# Patient Record
Sex: FEMALE | Race: WHITE | Employment: FULL TIME | URBAN - METROPOLITAN AREA
[De-identification: names, ages, dates, MRNs, and addresses within clinical notes are randomized per-mention and may not be internally consistent; named-entity substitution may affect disease eponyms.]

---

## 2015-06-10 LAB — PAP SMEAR, EXTERNAL: NEGATIVE

## 2017-02-01 RX ORDER — DEXTROAMPHETAMINE SACCHARATE, AMPHETAMINE ASPARTATE, DEXTROAMPHETAMINE SULFATE AND AMPHETAMINE SULFATE 5; 5; 5; 5 MG/1; MG/1; MG/1; MG/1
20 TABLET ORAL DAILY
Qty: 30 TABLET | Refills: 0 | Status: SHIPPED | OUTPATIENT
Start: 2017-02-01 | End: 2017-03-06 | Stop reason: SDUPTHER

## 2017-02-03 ENCOUNTER — OFFICE VISIT (OUTPATIENT)
Dept: FAMILY MEDICINE CLINIC | Age: 42
End: 2017-02-03

## 2017-02-03 VITALS
DIASTOLIC BLOOD PRESSURE: 74 MMHG | HEIGHT: 66 IN | BODY MASS INDEX: 21.86 KG/M2 | OXYGEN SATURATION: 97 % | SYSTOLIC BLOOD PRESSURE: 120 MMHG | WEIGHT: 136 LBS | HEART RATE: 88 BPM

## 2017-02-03 DIAGNOSIS — F98.8 ADD (ATTENTION DEFICIT DISORDER): Primary | ICD-10-CM

## 2017-02-03 PROCEDURE — 99213 OFFICE O/P EST LOW 20 MIN: CPT | Performed by: FAMILY MEDICINE

## 2017-02-03 ASSESSMENT — PATIENT HEALTH QUESTIONNAIRE - PHQ9
1. LITTLE INTEREST OR PLEASURE IN DOING THINGS: 0
SUM OF ALL RESPONSES TO PHQ QUESTIONS 1-9: 0
2. FEELING DOWN, DEPRESSED OR HOPELESS: 0
SUM OF ALL RESPONSES TO PHQ9 QUESTIONS 1 & 2: 0

## 2017-02-03 ASSESSMENT — ENCOUNTER SYMPTOMS: RESPIRATORY NEGATIVE: 1

## 2017-03-07 RX ORDER — DEXTROAMPHETAMINE SACCHARATE, AMPHETAMINE ASPARTATE, DEXTROAMPHETAMINE SULFATE AND AMPHETAMINE SULFATE 5; 5; 5; 5 MG/1; MG/1; MG/1; MG/1
20 TABLET ORAL DAILY
Qty: 30 TABLET | Refills: 0 | Status: SHIPPED | OUTPATIENT
Start: 2017-03-07 | End: 2017-04-12 | Stop reason: SDUPTHER

## 2017-04-12 ENCOUNTER — TELEPHONE (OUTPATIENT)
Dept: FAMILY MEDICINE CLINIC | Age: 42
End: 2017-04-12

## 2017-04-12 RX ORDER — DEXTROAMPHETAMINE SACCHARATE, AMPHETAMINE ASPARTATE, DEXTROAMPHETAMINE SULFATE AND AMPHETAMINE SULFATE 5; 5; 5; 5 MG/1; MG/1; MG/1; MG/1
20 TABLET ORAL DAILY
Qty: 30 TABLET | Refills: 0 | Status: SHIPPED | OUTPATIENT
Start: 2017-04-12 | End: 2017-05-11 | Stop reason: SDUPTHER

## 2017-04-12 RX ORDER — DOXYCYCLINE HYCLATE 100 MG
100 TABLET ORAL 2 TIMES DAILY
Qty: 60 TABLET | Refills: 1 | Status: SHIPPED | OUTPATIENT
Start: 2017-04-12 | End: 2017-10-16 | Stop reason: SDUPTHER

## 2017-05-11 RX ORDER — DEXTROAMPHETAMINE SACCHARATE, AMPHETAMINE ASPARTATE, DEXTROAMPHETAMINE SULFATE AND AMPHETAMINE SULFATE 5; 5; 5; 5 MG/1; MG/1; MG/1; MG/1
20 TABLET ORAL DAILY
Qty: 30 TABLET | Refills: 0 | Status: SHIPPED | OUTPATIENT
Start: 2017-05-11 | End: 2017-06-12 | Stop reason: SDUPTHER

## 2017-06-12 ENCOUNTER — TELEPHONE (OUTPATIENT)
Dept: FAMILY MEDICINE CLINIC | Age: 42
End: 2017-06-12

## 2017-06-12 RX ORDER — DEXTROAMPHETAMINE SACCHARATE, AMPHETAMINE ASPARTATE, DEXTROAMPHETAMINE SULFATE AND AMPHETAMINE SULFATE 5; 5; 5; 5 MG/1; MG/1; MG/1; MG/1
20 TABLET ORAL DAILY
Qty: 30 TABLET | Refills: 0 | Status: SHIPPED | OUTPATIENT
Start: 2017-06-12 | End: 2017-07-10 | Stop reason: SDUPTHER

## 2017-07-10 RX ORDER — DEXTROAMPHETAMINE SACCHARATE, AMPHETAMINE ASPARTATE, DEXTROAMPHETAMINE SULFATE AND AMPHETAMINE SULFATE 5; 5; 5; 5 MG/1; MG/1; MG/1; MG/1
20 TABLET ORAL DAILY
Qty: 30 TABLET | Refills: 0 | Status: SHIPPED | OUTPATIENT
Start: 2017-07-10 | End: 2017-08-17 | Stop reason: SDUPTHER

## 2017-08-03 ENCOUNTER — OFFICE VISIT (OUTPATIENT)
Dept: FAMILY MEDICINE CLINIC | Age: 42
End: 2017-08-03

## 2017-08-03 VITALS
SYSTOLIC BLOOD PRESSURE: 108 MMHG | WEIGHT: 137.2 LBS | TEMPERATURE: 97.7 F | DIASTOLIC BLOOD PRESSURE: 74 MMHG | RESPIRATION RATE: 14 BRPM | OXYGEN SATURATION: 99 % | HEIGHT: 66 IN | BODY MASS INDEX: 22.05 KG/M2 | HEART RATE: 89 BPM

## 2017-08-03 DIAGNOSIS — E55.9 VITAMIN D DEFICIENCY: ICD-10-CM

## 2017-08-03 DIAGNOSIS — F98.8 ADD (ATTENTION DEFICIT DISORDER): Primary | ICD-10-CM

## 2017-08-03 LAB
A/G RATIO: 1.7 (ref 1.1–2.2)
ALBUMIN SERPL-MCNC: 4.3 G/DL (ref 3.4–5)
ALP BLD-CCNC: 81 U/L (ref 40–129)
ALT SERPL-CCNC: 15 U/L (ref 10–40)
ANION GAP SERPL CALCULATED.3IONS-SCNC: 13 MMOL/L (ref 3–16)
AST SERPL-CCNC: 15 U/L (ref 15–37)
BILIRUB SERPL-MCNC: <0.2 MG/DL (ref 0–1)
BUN BLDV-MCNC: 10 MG/DL (ref 7–20)
CALCIUM SERPL-MCNC: 9.2 MG/DL (ref 8.3–10.6)
CHLORIDE BLD-SCNC: 101 MMOL/L (ref 99–110)
CO2: 25 MMOL/L (ref 21–32)
CREAT SERPL-MCNC: 0.5 MG/DL (ref 0.6–1.1)
GFR AFRICAN AMERICAN: >60
GFR NON-AFRICAN AMERICAN: >60
GLOBULIN: 2.5 G/DL
GLUCOSE BLD-MCNC: 110 MG/DL (ref 70–99)
POTASSIUM SERPL-SCNC: 3.9 MMOL/L (ref 3.5–5.1)
SODIUM BLD-SCNC: 139 MMOL/L (ref 136–145)
TOTAL PROTEIN: 6.8 G/DL (ref 6.4–8.2)

## 2017-08-03 PROCEDURE — 36415 COLL VENOUS BLD VENIPUNCTURE: CPT | Performed by: FAMILY MEDICINE

## 2017-08-03 PROCEDURE — 99213 OFFICE O/P EST LOW 20 MIN: CPT | Performed by: FAMILY MEDICINE

## 2017-08-04 DIAGNOSIS — R73.09 ELEVATED GLUCOSE: Primary | ICD-10-CM

## 2017-08-04 LAB
ESTIMATED AVERAGE GLUCOSE: 96.8 MG/DL
HBA1C MFR BLD: 5 %
VITAMIN D 25-HYDROXY: 29.7 NG/ML

## 2017-08-17 RX ORDER — DEXTROAMPHETAMINE SACCHARATE, AMPHETAMINE ASPARTATE, DEXTROAMPHETAMINE SULFATE AND AMPHETAMINE SULFATE 5; 5; 5; 5 MG/1; MG/1; MG/1; MG/1
20 TABLET ORAL DAILY
Qty: 30 TABLET | Refills: 0 | Status: SHIPPED | OUTPATIENT
Start: 2017-08-17 | End: 2017-09-14 | Stop reason: SDUPTHER

## 2017-09-06 ENCOUNTER — HOSPITAL ENCOUNTER (OUTPATIENT)
Dept: MAMMOGRAPHY | Age: 42
Discharge: HOME | End: 2017-09-06
Attending: FAMILY MEDICINE | Admitting: FAMILY MEDICINE

## 2017-09-06 DIAGNOSIS — Z12.31 VISIT FOR SCREENING MAMMOGRAM: ICD-10-CM

## 2017-09-14 RX ORDER — DEXTROAMPHETAMINE SACCHARATE, AMPHETAMINE ASPARTATE, DEXTROAMPHETAMINE SULFATE AND AMPHETAMINE SULFATE 5; 5; 5; 5 MG/1; MG/1; MG/1; MG/1
20 TABLET ORAL DAILY
Qty: 30 TABLET | Refills: 0 | Status: SHIPPED | OUTPATIENT
Start: 2017-09-14 | End: 2017-10-16 | Stop reason: SDUPTHER

## 2017-10-16 RX ORDER — DOXYCYCLINE HYCLATE 100 MG
100 TABLET ORAL 2 TIMES DAILY
Qty: 60 TABLET | Refills: 1 | Status: SHIPPED | OUTPATIENT
Start: 2017-10-16 | End: 2018-12-12 | Stop reason: SDUPTHER

## 2017-10-16 RX ORDER — DEXTROAMPHETAMINE SACCHARATE, AMPHETAMINE ASPARTATE, DEXTROAMPHETAMINE SULFATE AND AMPHETAMINE SULFATE 5; 5; 5; 5 MG/1; MG/1; MG/1; MG/1
20 TABLET ORAL DAILY
Qty: 30 TABLET | Refills: 0 | Status: SHIPPED | OUTPATIENT
Start: 2017-10-16 | End: 2017-11-15 | Stop reason: SDUPTHER

## 2017-11-15 NOTE — TELEPHONE ENCOUNTER
Last Seen: 8/3/2017    Last Writen: 10/16/2017    Last UDS: No UDS on file    OARRS Run On:  Please run OARRS     Med Agreement Signed On: 07/16/2015    Next Appointment: 2/14/2018    Requested Prescriptions     Pending Prescriptions Disp Refills    amphetamine-dextroamphetamine (ADDERALL, 20MG,) 20 MG tablet 30 tablet 0     Sig: Take 1 tablet by mouth daily .

## 2017-11-16 RX ORDER — DEXTROAMPHETAMINE SACCHARATE, AMPHETAMINE ASPARTATE, DEXTROAMPHETAMINE SULFATE AND AMPHETAMINE SULFATE 5; 5; 5; 5 MG/1; MG/1; MG/1; MG/1
20 TABLET ORAL DAILY
Qty: 30 TABLET | Refills: 0 | Status: SHIPPED | OUTPATIENT
Start: 2017-11-16 | End: 2017-12-18 | Stop reason: SDUPTHER

## 2017-12-18 RX ORDER — DEXTROAMPHETAMINE SACCHARATE, AMPHETAMINE ASPARTATE, DEXTROAMPHETAMINE SULFATE AND AMPHETAMINE SULFATE 5; 5; 5; 5 MG/1; MG/1; MG/1; MG/1
20 TABLET ORAL DAILY
Qty: 30 TABLET | Refills: 0 | Status: SHIPPED | OUTPATIENT
Start: 2017-12-18 | End: 2018-01-24 | Stop reason: SDUPTHER

## 2017-12-18 NOTE — TELEPHONE ENCOUNTER
Last Seen: 8/3/2017    Last Writen: 11-    Last UDS: none on file    OARRS Run On: 12-    Med Agreement Signed On: 7-    Next Appointment: 2/14/2018    Requested Prescriptions     Pending Prescriptions Disp Refills    amphetamine-dextroamphetamine (ADDERALL, 20MG,) 20 MG tablet 30 tablet 0     Sig: Take 1 tablet by mouth daily .

## 2018-01-23 ENCOUNTER — TELEPHONE (OUTPATIENT)
Dept: FAMILY MEDICINE CLINIC | Age: 43
End: 2018-01-23

## 2018-01-24 RX ORDER — DEXTROAMPHETAMINE SACCHARATE, AMPHETAMINE ASPARTATE, DEXTROAMPHETAMINE SULFATE AND AMPHETAMINE SULFATE 5; 5; 5; 5 MG/1; MG/1; MG/1; MG/1
20 TABLET ORAL DAILY
Qty: 30 TABLET | Refills: 0 | Status: SHIPPED | OUTPATIENT
Start: 2018-01-24 | End: 2018-03-05 | Stop reason: SDUPTHER

## 2018-02-05 ENCOUNTER — TELEPHONE (OUTPATIENT)
Dept: FAMILY MEDICINE CLINIC | Age: 43
End: 2018-02-05

## 2018-02-05 DIAGNOSIS — Z20.828 EXPOSURE TO INFLUENZA: Primary | ICD-10-CM

## 2018-02-05 RX ORDER — OSELTAMIVIR PHOSPHATE 75 MG/1
75 CAPSULE ORAL DAILY
Qty: 10 CAPSULE | Refills: 0 | Status: SHIPPED | OUTPATIENT
Start: 2018-02-05 | End: 2018-02-15

## 2018-03-05 RX ORDER — DEXTROAMPHETAMINE SACCHARATE, AMPHETAMINE ASPARTATE, DEXTROAMPHETAMINE SULFATE AND AMPHETAMINE SULFATE 5; 5; 5; 5 MG/1; MG/1; MG/1; MG/1
20 TABLET ORAL DAILY
Qty: 30 TABLET | Refills: 0 | Status: SHIPPED | OUTPATIENT
Start: 2018-03-05 | End: 2018-03-19 | Stop reason: SDUPTHER

## 2018-03-05 NOTE — TELEPHONE ENCOUNTER
Last ROBR:[id number]    Last Writen: 1-    Last UDS: None on file    OARRS Run On: 3-5-2018    Med Agreement Signed On: 7-    Next Appointment: none scheduled Cancelled or NS last 3 appts. Requested Prescriptions     Pending Prescriptions Disp Refills    amphetamine-dextroamphetamine (ADDERALL, 20MG,) 20 MG tablet 30 tablet 0     Sig: Take 1 tablet by mouth daily for 31 days.

## 2018-03-19 ENCOUNTER — OFFICE VISIT (OUTPATIENT)
Dept: FAMILY MEDICINE CLINIC | Age: 43
End: 2018-03-19

## 2018-03-19 VITALS
HEART RATE: 105 BPM | WEIGHT: 139 LBS | BODY MASS INDEX: 21.82 KG/M2 | HEIGHT: 67 IN | SYSTOLIC BLOOD PRESSURE: 130 MMHG | DIASTOLIC BLOOD PRESSURE: 82 MMHG | OXYGEN SATURATION: 99 %

## 2018-03-19 DIAGNOSIS — F98.8 ATTENTION DEFICIT DISORDER, UNSPECIFIED HYPERACTIVITY PRESENCE: Primary | ICD-10-CM

## 2018-03-19 DIAGNOSIS — J30.2 CHRONIC SEASONAL ALLERGIC RHINITIS, UNSPECIFIED TRIGGER: ICD-10-CM

## 2018-03-19 DIAGNOSIS — F32.A DEPRESSION, UNSPECIFIED DEPRESSION TYPE: ICD-10-CM

## 2018-03-19 LAB
T4 FREE: 1.1 NG/DL (ref 0.9–1.8)
TSH SERPL DL<=0.05 MIU/L-ACNC: 1.78 UIU/ML (ref 0.27–4.2)

## 2018-03-19 PROCEDURE — 99214 OFFICE O/P EST MOD 30 MIN: CPT | Performed by: FAMILY MEDICINE

## 2018-03-19 RX ORDER — BUPROPION HYDROCHLORIDE 150 MG/1
TABLET, EXTENDED RELEASE ORAL
Qty: 60 TABLET | Refills: 1 | Status: SHIPPED | OUTPATIENT
Start: 2018-03-19 | End: 2018-09-19 | Stop reason: ALTCHOICE

## 2018-03-19 RX ORDER — DEXTROAMPHETAMINE SACCHARATE, AMPHETAMINE ASPARTATE, DEXTROAMPHETAMINE SULFATE AND AMPHETAMINE SULFATE 5; 5; 5; 5 MG/1; MG/1; MG/1; MG/1
20 TABLET ORAL DAILY
Qty: 30 TABLET | Refills: 0 | Status: SHIPPED | OUTPATIENT
Start: 2018-03-19 | End: 2018-04-20 | Stop reason: SDUPTHER

## 2018-03-19 ASSESSMENT — ENCOUNTER SYMPTOMS
RESPIRATORY NEGATIVE: 1
GASTROINTESTINAL NEGATIVE: 1

## 2018-03-19 ASSESSMENT — PATIENT HEALTH QUESTIONNAIRE - PHQ9
1. LITTLE INTEREST OR PLEASURE IN DOING THINGS: 1
SUM OF ALL RESPONSES TO PHQ QUESTIONS 1-9: 1
2. FEELING DOWN, DEPRESSED OR HOPELESS: 0
SUM OF ALL RESPONSES TO PHQ9 QUESTIONS 1 & 2: 1

## 2018-04-20 RX ORDER — DEXTROAMPHETAMINE SACCHARATE, AMPHETAMINE ASPARTATE, DEXTROAMPHETAMINE SULFATE AND AMPHETAMINE SULFATE 5; 5; 5; 5 MG/1; MG/1; MG/1; MG/1
20 TABLET ORAL DAILY
Qty: 30 TABLET | Refills: 0 | Status: SHIPPED | OUTPATIENT
Start: 2018-04-20 | End: 2018-05-30 | Stop reason: SDUPTHER

## 2018-05-30 DIAGNOSIS — F98.8 ATTENTION DEFICIT DISORDER, UNSPECIFIED HYPERACTIVITY PRESENCE: Primary | ICD-10-CM

## 2018-05-30 RX ORDER — DEXTROAMPHETAMINE SACCHARATE, AMPHETAMINE ASPARTATE, DEXTROAMPHETAMINE SULFATE AND AMPHETAMINE SULFATE 5; 5; 5; 5 MG/1; MG/1; MG/1; MG/1
20 TABLET ORAL DAILY
Qty: 30 TABLET | Refills: 0 | Status: SHIPPED | OUTPATIENT
Start: 2018-05-30 | End: 2018-07-02 | Stop reason: SDUPTHER

## 2018-07-02 DIAGNOSIS — F98.8 ATTENTION DEFICIT DISORDER, UNSPECIFIED HYPERACTIVITY PRESENCE: ICD-10-CM

## 2018-07-02 RX ORDER — DEXTROAMPHETAMINE SACCHARATE, AMPHETAMINE ASPARTATE, DEXTROAMPHETAMINE SULFATE AND AMPHETAMINE SULFATE 5; 5; 5; 5 MG/1; MG/1; MG/1; MG/1
20 TABLET ORAL DAILY
Qty: 30 TABLET | Refills: 0 | Status: SHIPPED | OUTPATIENT
Start: 2018-07-02 | End: 2018-08-13 | Stop reason: SDUPTHER

## 2018-07-02 NOTE — TELEPHONE ENCOUNTER
Last Seen: 3/19/2018    Last Writen: 5/30/18    Last UDS: No UDS    OARRS Run On: attached    Med Agreement Signed On: 7/16/15    Next Appointment: Visit date not found    Requested Prescriptions     Pending Prescriptions Disp Refills    amphetamine-dextroamphetamine (ADDERALL, 20MG,) 20 MG tablet 30 tablet 0     Sig: Take 1 tablet by mouth daily for 31 days. Jeff Merrill

## 2018-08-13 DIAGNOSIS — F98.8 ATTENTION DEFICIT DISORDER, UNSPECIFIED HYPERACTIVITY PRESENCE: ICD-10-CM

## 2018-08-13 RX ORDER — DEXTROAMPHETAMINE SACCHARATE, AMPHETAMINE ASPARTATE, DEXTROAMPHETAMINE SULFATE AND AMPHETAMINE SULFATE 5; 5; 5; 5 MG/1; MG/1; MG/1; MG/1
20 TABLET ORAL DAILY
Qty: 30 TABLET | Refills: 0 | Status: SHIPPED | OUTPATIENT
Start: 2018-08-13 | End: 2018-09-19 | Stop reason: SDUPTHER

## 2018-09-19 ENCOUNTER — OFFICE VISIT (OUTPATIENT)
Dept: FAMILY MEDICINE CLINIC | Age: 43
End: 2018-09-19

## 2018-09-19 VITALS
DIASTOLIC BLOOD PRESSURE: 60 MMHG | BODY MASS INDEX: 21.38 KG/M2 | SYSTOLIC BLOOD PRESSURE: 96 MMHG | OXYGEN SATURATION: 99 % | HEIGHT: 66 IN | HEART RATE: 110 BPM | WEIGHT: 133 LBS

## 2018-09-19 DIAGNOSIS — F98.8 ATTENTION DEFICIT DISORDER, UNSPECIFIED HYPERACTIVITY PRESENCE: ICD-10-CM

## 2018-09-19 DIAGNOSIS — S39.012A SACROILIAC STRAIN, INITIAL ENCOUNTER: Primary | ICD-10-CM

## 2018-09-19 PROCEDURE — 99213 OFFICE O/P EST LOW 20 MIN: CPT | Performed by: FAMILY MEDICINE

## 2018-09-19 RX ORDER — BUPROPION HYDROCHLORIDE 150 MG/1
TABLET, EXTENDED RELEASE ORAL
Qty: 60 TABLET | Refills: 1 | Status: SHIPPED | OUTPATIENT
Start: 2018-09-19 | End: 2018-12-18 | Stop reason: SDUPTHER

## 2018-09-19 RX ORDER — DEXTROAMPHETAMINE SACCHARATE, AMPHETAMINE ASPARTATE, DEXTROAMPHETAMINE SULFATE AND AMPHETAMINE SULFATE 5; 5; 5; 5 MG/1; MG/1; MG/1; MG/1
20 TABLET ORAL DAILY
Qty: 30 TABLET | Refills: 0 | Status: SHIPPED | OUTPATIENT
Start: 2018-09-19 | End: 2018-11-09 | Stop reason: SDUPTHER

## 2018-09-19 RX ORDER — PREDNISONE 20 MG/1
20 TABLET ORAL 2 TIMES DAILY
Qty: 12 TABLET | Refills: 0 | Status: SHIPPED | OUTPATIENT
Start: 2018-09-19 | End: 2018-09-25

## 2018-11-09 DIAGNOSIS — F98.8 ATTENTION DEFICIT DISORDER, UNSPECIFIED HYPERACTIVITY PRESENCE: ICD-10-CM

## 2018-11-09 RX ORDER — DEXTROAMPHETAMINE SACCHARATE, AMPHETAMINE ASPARTATE, DEXTROAMPHETAMINE SULFATE AND AMPHETAMINE SULFATE 5; 5; 5; 5 MG/1; MG/1; MG/1; MG/1
20 TABLET ORAL DAILY
Qty: 30 TABLET | Refills: 0 | Status: SHIPPED | OUTPATIENT
Start: 2018-11-09 | End: 2018-12-12 | Stop reason: SDUPTHER

## 2018-12-12 DIAGNOSIS — F98.8 ATTENTION DEFICIT DISORDER, UNSPECIFIED HYPERACTIVITY PRESENCE: ICD-10-CM

## 2018-12-12 RX ORDER — DOXYCYCLINE HYCLATE 100 MG
100 TABLET ORAL 2 TIMES DAILY
Qty: 60 TABLET | Refills: 1 | Status: SHIPPED | OUTPATIENT
Start: 2018-12-12 | End: 2019-03-27 | Stop reason: SDUPTHER

## 2018-12-12 RX ORDER — DEXTROAMPHETAMINE SACCHARATE, AMPHETAMINE ASPARTATE, DEXTROAMPHETAMINE SULFATE AND AMPHETAMINE SULFATE 5; 5; 5; 5 MG/1; MG/1; MG/1; MG/1
20 TABLET ORAL DAILY
Qty: 30 TABLET | Refills: 0 | Status: SHIPPED | OUTPATIENT
Start: 2018-12-12 | End: 2019-02-20 | Stop reason: SDUPTHER

## 2018-12-18 RX ORDER — BUPROPION HYDROCHLORIDE 150 MG/1
TABLET, EXTENDED RELEASE ORAL
Qty: 60 TABLET | Refills: 1 | OUTPATIENT
Start: 2018-12-18

## 2018-12-18 RX ORDER — BUPROPION HYDROCHLORIDE 150 MG/1
TABLET, EXTENDED RELEASE ORAL
Qty: 60 TABLET | Refills: 3 | Status: SHIPPED | OUTPATIENT
Start: 2018-12-18 | End: 2019-11-03 | Stop reason: SDUPTHER

## 2018-12-18 NOTE — TELEPHONE ENCOUNTER
.  Last office visit  18    Last written 18 #60 with 1 refill    Next office visit scheduled 3/7/2019    Requested Prescriptions     Pending Prescriptions Disp Refills    buPROPion (WELLBUTRIN SR) 150 MG extended release tablet 60 tablet 1     Si daily x 1 week then 1 bid

## 2018-12-18 NOTE — TELEPHONE ENCOUNTER
From: Domenico Jaimes  Sent: 12/18/2018 8:22 AM EST  Subject: Medication Renewal Request    Pam Cole.  Cirilo Estrada would like a refill of the following medications:     buPROPion [facility] SR) 150 MG extended release tablet AMALIA Swan    Preferred pharmacy: [facility] Geetha [facility] [phone]

## 2018-12-18 NOTE — TELEPHONE ENCOUNTER
Last office visit  2018    Last written 2018, 60 tablets with 1 refill.      Next office visit scheduled 3/7/2019    Requested Prescriptions     Pending Prescriptions Disp Refills    buPROPion (WELLBUTRIN SR) 150 MG extended release tablet 60 tablet 1     Si daily x 1 week then 1 bid

## 2019-01-23 RX ORDER — TRAZODONE HYDROCHLORIDE 50 MG/1
TABLET ORAL
Qty: 30 TABLET | Refills: 0 | Status: SHIPPED | OUTPATIENT
Start: 2019-01-23 | End: 2019-12-04

## 2019-02-20 DIAGNOSIS — F98.8 ATTENTION DEFICIT DISORDER, UNSPECIFIED HYPERACTIVITY PRESENCE: ICD-10-CM

## 2019-02-21 RX ORDER — DEXTROAMPHETAMINE SACCHARATE, AMPHETAMINE ASPARTATE, DEXTROAMPHETAMINE SULFATE AND AMPHETAMINE SULFATE 5; 5; 5; 5 MG/1; MG/1; MG/1; MG/1
20 TABLET ORAL DAILY
Qty: 30 TABLET | Refills: 0 | Status: SHIPPED | OUTPATIENT
Start: 2019-02-21 | End: 2019-03-27 | Stop reason: SDUPTHER

## 2019-03-27 ENCOUNTER — OFFICE VISIT (OUTPATIENT)
Dept: FAMILY MEDICINE CLINIC | Age: 44
End: 2019-03-27
Payer: COMMERCIAL

## 2019-03-27 VITALS
HEART RATE: 116 BPM | SYSTOLIC BLOOD PRESSURE: 112 MMHG | HEIGHT: 66 IN | OXYGEN SATURATION: 97 % | WEIGHT: 139 LBS | BODY MASS INDEX: 22.34 KG/M2 | DIASTOLIC BLOOD PRESSURE: 80 MMHG

## 2019-03-27 DIAGNOSIS — N32.81 OAB (OVERACTIVE BLADDER): ICD-10-CM

## 2019-03-27 DIAGNOSIS — F32.A DEPRESSION, UNSPECIFIED DEPRESSION TYPE: ICD-10-CM

## 2019-03-27 DIAGNOSIS — F98.8 ATTENTION DEFICIT DISORDER, UNSPECIFIED HYPERACTIVITY PRESENCE: Primary | ICD-10-CM

## 2019-03-27 PROCEDURE — 99214 OFFICE O/P EST MOD 30 MIN: CPT | Performed by: FAMILY MEDICINE

## 2019-03-27 RX ORDER — OXYBUTYNIN CHLORIDE 5 MG/1
5 TABLET ORAL 3 TIMES DAILY
Qty: 90 TABLET | Refills: 0 | Status: SHIPPED | OUTPATIENT
Start: 2019-03-27 | End: 2020-06-02 | Stop reason: SDUPTHER

## 2019-03-27 RX ORDER — DOXYCYCLINE HYCLATE 100 MG
100 TABLET ORAL 2 TIMES DAILY
Qty: 60 TABLET | Refills: 2 | Status: SHIPPED | OUTPATIENT
Start: 2019-03-27 | End: 2020-02-24 | Stop reason: SDUPTHER

## 2019-03-27 RX ORDER — DEXTROAMPHETAMINE SACCHARATE, AMPHETAMINE ASPARTATE, DEXTROAMPHETAMINE SULFATE AND AMPHETAMINE SULFATE 5; 5; 5; 5 MG/1; MG/1; MG/1; MG/1
20 TABLET ORAL DAILY
Qty: 30 TABLET | Refills: 0 | Status: SHIPPED | OUTPATIENT
Start: 2019-03-27 | End: 2019-05-17 | Stop reason: SDUPTHER

## 2019-03-27 ASSESSMENT — ENCOUNTER SYMPTOMS
SHORTNESS OF BREATH: 0
ABDOMINAL PAIN: 0
BLOOD IN STOOL: 0
COUGH: 0

## 2019-05-16 DIAGNOSIS — F98.8 ATTENTION DEFICIT DISORDER, UNSPECIFIED HYPERACTIVITY PRESENCE: ICD-10-CM

## 2019-05-16 NOTE — TELEPHONE ENCOUNTER
Patient requesting a medication refill.     Medication  amphetamine-dextroamphetamine (ADDERALL, 20MG,) 20 MG tablet       Pharmacy  [facility] Geetha [facility]shea [phone]    Last office visit: 3/27/2019   Next office visit: 9/27/2019

## 2019-05-17 RX ORDER — DEXTROAMPHETAMINE SACCHARATE, AMPHETAMINE ASPARTATE, DEXTROAMPHETAMINE SULFATE AND AMPHETAMINE SULFATE 5; 5; 5; 5 MG/1; MG/1; MG/1; MG/1
20 TABLET ORAL DAILY
Qty: 30 TABLET | Refills: 0 | Status: SHIPPED | OUTPATIENT
Start: 2019-05-17 | End: 2019-06-19 | Stop reason: SDUPTHER

## 2019-06-18 ENCOUNTER — TELEPHONE (OUTPATIENT)
Dept: FAMILY MEDICINE CLINIC | Age: 44
End: 2019-06-18

## 2019-06-18 DIAGNOSIS — F98.8 ATTENTION DEFICIT DISORDER, UNSPECIFIED HYPERACTIVITY PRESENCE: ICD-10-CM

## 2019-06-18 NOTE — TELEPHONE ENCOUNTER
Patient requesting a medication refill.     Medication  amphetamine-dextroamphetamine (ADDERALL, 20MG,) 20 MG tablet         Pharmacy  [address] [place] [address] Geetha Negron [phone]     Last office visit: 3/27/2019     Next office visit: 9/27/2019

## 2019-06-19 RX ORDER — DEXTROAMPHETAMINE SACCHARATE, AMPHETAMINE ASPARTATE, DEXTROAMPHETAMINE SULFATE AND AMPHETAMINE SULFATE 5; 5; 5; 5 MG/1; MG/1; MG/1; MG/1
20 TABLET ORAL DAILY
Qty: 30 TABLET | Refills: 0 | Status: SHIPPED | OUTPATIENT
Start: 2019-06-19 | End: 2019-07-25 | Stop reason: SDUPTHER

## 2019-07-24 DIAGNOSIS — F98.8 ATTENTION DEFICIT DISORDER, UNSPECIFIED HYPERACTIVITY PRESENCE: ICD-10-CM

## 2019-07-25 DIAGNOSIS — F98.8 ATTENTION DEFICIT DISORDER, UNSPECIFIED HYPERACTIVITY PRESENCE: ICD-10-CM

## 2019-07-25 RX ORDER — DEXTROAMPHETAMINE SACCHARATE, AMPHETAMINE ASPARTATE, DEXTROAMPHETAMINE SULFATE AND AMPHETAMINE SULFATE 5; 5; 5; 5 MG/1; MG/1; MG/1; MG/1
20 TABLET ORAL DAILY
Qty: 30 TABLET | Refills: 0 | Status: SHIPPED | OUTPATIENT
Start: 2019-07-25 | End: 2019-08-28 | Stop reason: SDUPTHER

## 2019-07-25 RX ORDER — DEXTROAMPHETAMINE SACCHARATE, AMPHETAMINE ASPARTATE, DEXTROAMPHETAMINE SULFATE AND AMPHETAMINE SULFATE 5; 5; 5; 5 MG/1; MG/1; MG/1; MG/1
20 TABLET ORAL DAILY
Qty: 30 TABLET | Refills: 0 | OUTPATIENT
Start: 2019-07-25 | End: 2019-08-24

## 2019-07-25 RX ORDER — DEXTROAMPHETAMINE SACCHARATE, AMPHETAMINE ASPARTATE, DEXTROAMPHETAMINE SULFATE AND AMPHETAMINE SULFATE 5; 5; 5; 5 MG/1; MG/1; MG/1; MG/1
20 TABLET ORAL DAILY
Qty: 30 TABLET | Refills: 0 | Status: SHIPPED | OUTPATIENT
Start: 2019-07-25 | End: 2019-07-25 | Stop reason: SDUPTHER

## 2019-08-28 ENCOUNTER — TELEPHONE (OUTPATIENT)
Dept: FAMILY MEDICINE CLINIC | Age: 44
End: 2019-08-28

## 2019-08-28 DIAGNOSIS — F98.8 ATTENTION DEFICIT DISORDER, UNSPECIFIED HYPERACTIVITY PRESENCE: ICD-10-CM

## 2019-08-28 RX ORDER — DEXTROAMPHETAMINE SACCHARATE, AMPHETAMINE ASPARTATE, DEXTROAMPHETAMINE SULFATE AND AMPHETAMINE SULFATE 5; 5; 5; 5 MG/1; MG/1; MG/1; MG/1
20 TABLET ORAL DAILY
Qty: 30 TABLET | Refills: 0 | Status: SHIPPED | OUTPATIENT
Start: 2019-08-28 | End: 2019-10-02 | Stop reason: SDUPTHER

## 2019-10-02 ENCOUNTER — TELEPHONE (OUTPATIENT)
Dept: FAMILY MEDICINE CLINIC | Age: 44
End: 2019-10-02

## 2019-10-02 DIAGNOSIS — F98.8 ATTENTION DEFICIT DISORDER, UNSPECIFIED HYPERACTIVITY PRESENCE: ICD-10-CM

## 2019-10-02 RX ORDER — DEXTROAMPHETAMINE SACCHARATE, AMPHETAMINE ASPARTATE, DEXTROAMPHETAMINE SULFATE AND AMPHETAMINE SULFATE 5; 5; 5; 5 MG/1; MG/1; MG/1; MG/1
20 TABLET ORAL DAILY
Qty: 30 TABLET | Refills: 0 | Status: SHIPPED | OUTPATIENT
Start: 2019-10-02 | End: 2019-11-03 | Stop reason: SDUPTHER

## 2019-11-03 DIAGNOSIS — F98.8 ATTENTION DEFICIT DISORDER, UNSPECIFIED HYPERACTIVITY PRESENCE: ICD-10-CM

## 2019-11-05 DIAGNOSIS — F98.8 ATTENTION DEFICIT DISORDER, UNSPECIFIED HYPERACTIVITY PRESENCE: ICD-10-CM

## 2019-11-05 RX ORDER — DEXTROAMPHETAMINE SACCHARATE, AMPHETAMINE ASPARTATE, DEXTROAMPHETAMINE SULFATE AND AMPHETAMINE SULFATE 5; 5; 5; 5 MG/1; MG/1; MG/1; MG/1
20 TABLET ORAL DAILY
Qty: 30 TABLET | Refills: 0 | Status: SHIPPED | OUTPATIENT
Start: 2019-11-05 | End: 2019-12-04 | Stop reason: SDUPTHER

## 2019-11-07 ENCOUNTER — TELEPHONE (OUTPATIENT)
Dept: FAMILY MEDICINE CLINIC | Age: 44
End: 2019-11-07

## 2019-11-07 RX ORDER — DEXTROAMPHETAMINE SACCHARATE, AMPHETAMINE ASPARTATE, DEXTROAMPHETAMINE SULFATE AND AMPHETAMINE SULFATE 5; 5; 5; 5 MG/1; MG/1; MG/1; MG/1
20 TABLET ORAL DAILY
Qty: 30 TABLET | Refills: 0 | OUTPATIENT
Start: 2019-11-07 | End: 2019-12-07

## 2019-12-04 ENCOUNTER — OFFICE VISIT (OUTPATIENT)
Dept: FAMILY MEDICINE CLINIC | Age: 44
End: 2019-12-04
Payer: COMMERCIAL

## 2019-12-04 VITALS
HEART RATE: 101 BPM | OXYGEN SATURATION: 99 % | DIASTOLIC BLOOD PRESSURE: 78 MMHG | SYSTOLIC BLOOD PRESSURE: 130 MMHG | HEIGHT: 67 IN | BODY MASS INDEX: 21.56 KG/M2 | WEIGHT: 137.4 LBS

## 2019-12-04 DIAGNOSIS — F32.A DEPRESSION, UNSPECIFIED DEPRESSION TYPE: ICD-10-CM

## 2019-12-04 DIAGNOSIS — F98.8 ATTENTION DEFICIT DISORDER, UNSPECIFIED HYPERACTIVITY PRESENCE: Primary | ICD-10-CM

## 2019-12-04 DIAGNOSIS — N32.81 OAB (OVERACTIVE BLADDER): ICD-10-CM

## 2019-12-04 PROCEDURE — 99214 OFFICE O/P EST MOD 30 MIN: CPT | Performed by: FAMILY MEDICINE

## 2019-12-04 RX ORDER — DEXTROAMPHETAMINE SACCHARATE, AMPHETAMINE ASPARTATE, DEXTROAMPHETAMINE SULFATE AND AMPHETAMINE SULFATE 5; 5; 5; 5 MG/1; MG/1; MG/1; MG/1
20 TABLET ORAL DAILY
Qty: 30 TABLET | Refills: 0 | Status: SHIPPED | OUTPATIENT
Start: 2019-12-04 | End: 2020-01-15 | Stop reason: SDUPTHER

## 2019-12-04 ASSESSMENT — ENCOUNTER SYMPTOMS
BLOOD IN STOOL: 0
CHEST TIGHTNESS: 0
COUGH: 0
SHORTNESS OF BREATH: 0
ABDOMINAL PAIN: 0

## 2020-01-16 RX ORDER — DEXTROAMPHETAMINE SACCHARATE, AMPHETAMINE ASPARTATE, DEXTROAMPHETAMINE SULFATE AND AMPHETAMINE SULFATE 5; 5; 5; 5 MG/1; MG/1; MG/1; MG/1
20 TABLET ORAL DAILY
Qty: 30 TABLET | Refills: 0 | Status: SHIPPED | OUTPATIENT
Start: 2020-01-16 | End: 2020-02-24 | Stop reason: SDUPTHER

## 2020-02-24 ENCOUNTER — TELEPHONE (OUTPATIENT)
Dept: FAMILY MEDICINE CLINIC | Age: 45
End: 2020-02-24

## 2020-02-24 RX ORDER — DEXTROAMPHETAMINE SACCHARATE, AMPHETAMINE ASPARTATE, DEXTROAMPHETAMINE SULFATE AND AMPHETAMINE SULFATE 5; 5; 5; 5 MG/1; MG/1; MG/1; MG/1
20 TABLET ORAL DAILY
Qty: 30 TABLET | Refills: 0 | Status: SHIPPED | OUTPATIENT
Start: 2020-02-24 | End: 2020-03-31 | Stop reason: SDUPTHER

## 2020-02-25 RX ORDER — DEXTROAMPHETAMINE SACCHARATE, AMPHETAMINE ASPARTATE, DEXTROAMPHETAMINE SULFATE AND AMPHETAMINE SULFATE 5; 5; 5; 5 MG/1; MG/1; MG/1; MG/1
20 TABLET ORAL DAILY
Qty: 30 TABLET | Refills: 0 | OUTPATIENT
Start: 2020-02-25 | End: 2020-03-26

## 2020-02-25 RX ORDER — DOXYCYCLINE HYCLATE 100 MG
100 TABLET ORAL 2 TIMES DAILY
Qty: 60 TABLET | Refills: 5 | Status: SHIPPED | OUTPATIENT
Start: 2020-02-25 | End: 2020-02-26

## 2020-02-25 NOTE — TELEPHONE ENCOUNTER
Refill Request     Last Seen: 12/4/2019    Last Written: 03/2/19    Next Appointment:   Future Appointments   Date Time Provider Chente Kaplan   6/5/2020  8:00 AM DO JOSELINE Malcolm [facility]             Requested Prescriptions     Pending Prescriptions Disp Refills    doxycycline hyclate (VIBRA-TABS) 100 MG tablet 60 tablet 5     Sig: Take 1 tablet by mouth 2 times daily     Refused Prescriptions Disp Refills    amphetamine-dextroamphetamine (ADDERALL, 20MG,) 20 MG tablet 30 tablet 0     Sig: Take 1 tablet by mouth daily for 30 days.      Refused By: Nellie Dao     Reason for Refusal: Refill not appropriate

## 2020-02-26 ENCOUNTER — OFFICE VISIT (OUTPATIENT)
Dept: FAMILY MEDICINE CLINIC | Age: 45
End: 2020-02-26
Payer: COMMERCIAL

## 2020-02-26 VITALS
HEART RATE: 99 BPM | RESPIRATION RATE: 14 BRPM | HEIGHT: 66 IN | DIASTOLIC BLOOD PRESSURE: 70 MMHG | WEIGHT: 139.6 LBS | SYSTOLIC BLOOD PRESSURE: 100 MMHG | OXYGEN SATURATION: 98 % | BODY MASS INDEX: 22.43 KG/M2 | TEMPERATURE: 97.6 F

## 2020-02-26 PROCEDURE — 99213 OFFICE O/P EST LOW 20 MIN: CPT | Performed by: NURSE PRACTITIONER

## 2020-02-26 RX ORDER — BENZONATATE 100 MG/1
100 CAPSULE ORAL 3 TIMES DAILY PRN
Qty: 30 CAPSULE | Refills: 0 | Status: SHIPPED | OUTPATIENT
Start: 2020-02-26 | End: 2020-06-02

## 2020-02-26 RX ORDER — CEFDINIR 300 MG/1
300 CAPSULE ORAL 2 TIMES DAILY
Qty: 14 CAPSULE | Refills: 0 | Status: SHIPPED | OUTPATIENT
Start: 2020-02-26 | End: 2020-03-04

## 2020-02-26 RX ORDER — DOXYCYCLINE HYCLATE 100 MG/1
100 CAPSULE ORAL 2 TIMES DAILY
COMMUNITY
End: 2020-06-02 | Stop reason: SDUPTHER

## 2020-02-26 ASSESSMENT — ENCOUNTER SYMPTOMS
SHORTNESS OF BREATH: 0
CHEST TIGHTNESS: 0
SORE THROAT: 1
DIARRHEA: 0
COUGH: 1

## 2020-02-26 NOTE — PATIENT INSTRUCTIONS
1. Increase fluids  2. mucinex DM 2 times daily  3. Tylenol or Ibuprofen TID as needed  4. Hold doxycycline while taking antibiotic  5. Yvone Cooks was seen today for cough and congestion. Diagnoses and all orders for this visit:    Bronchitis  -     cefdinir (OMNICEF) 300 MG capsule; Take 1 capsule by mouth 2 times daily for 7 days    Cough  -     benzonatate (TESSALON PERLES) 100 MG capsule;  Take 1 capsule by mouth 3 times daily as needed for Cough

## 2020-02-26 NOTE — PROGRESS NOTES
Subjective:      Patient ID: Nancy Vazquez is a 40 y.o. female. HPI     1 month ago with head cold. Cough. Improved. Continues with cough at night past 2 weeks. Morning phlegm yellow. Denies temps. Taking sudafed. Review of Systems   Constitutional: Negative for chills, fever and unexpected weight change. HENT: Positive for postnasal drip and sore throat. Respiratory: Positive for cough. Negative for chest tightness and shortness of breath. Cardiovascular: Negative for chest pain and palpitations. Gastrointestinal: Negative for diarrhea. Neurological: Negative for dizziness and headaches. Psychiatric/Behavioral: Negative. Objective:   Physical Exam  Constitutional:       Appearance: Normal appearance. She is well-developed. HENT:      Head: Normocephalic and atraumatic. Right Ear: Tympanic membrane and ear canal normal.      Left Ear: Tympanic membrane and ear canal normal.      Nose: No congestion or rhinorrhea. Mouth/Throat:      Pharynx: No oropharyngeal exudate or posterior oropharyngeal erythema. Comments: Tonsils enlarged, irregular  Neck:      Musculoskeletal: Normal range of motion and neck supple. Thyroid: No thyromegaly. Cardiovascular:      Rate and Rhythm: Normal rate and regular rhythm. Pulmonary:      Effort: Pulmonary effort is normal.      Breath sounds: Rhonchi present. No wheezing. Comments: Scattered faint rhonchi to cough. Mild dry cough to command. Abdominal:      General: Bowel sounds are normal.      Palpations: Abdomen is soft. Musculoskeletal: Normal range of motion. Skin:     General: Skin is warm. Neurological:      Mental Status: She is alert. Psychiatric:         Behavior: Behavior normal.         Assessment:      1. Bronchitis  2. Cough        Plan:      1. Increase fluids  2. mucinex DM 2 times daily  3. Tylenol or Ibuprofen TID as needed  4. Hold doxycycline while taking antibiotic  5.  Emerita Easley was seen today for

## 2020-03-09 RX ORDER — AZITHROMYCIN 250 MG/1
250 TABLET, FILM COATED ORAL SEE ADMIN INSTRUCTIONS
Qty: 6 TABLET | Refills: 0 | Status: SHIPPED | OUTPATIENT
Start: 2020-03-09 | End: 2020-03-14

## 2020-03-31 RX ORDER — DEXTROAMPHETAMINE SACCHARATE, AMPHETAMINE ASPARTATE, DEXTROAMPHETAMINE SULFATE AND AMPHETAMINE SULFATE 5; 5; 5; 5 MG/1; MG/1; MG/1; MG/1
20 TABLET ORAL DAILY
Qty: 30 TABLET | Refills: 0 | Status: SHIPPED | OUTPATIENT
Start: 2020-03-31 | End: 2020-05-03 | Stop reason: SDUPTHER

## 2020-03-31 NOTE — TELEPHONE ENCOUNTER
.  Last office visit 2/26/2020     Last written 2/24 30 with 0      Next office visit scheduled 6/2/2020    Requested Prescriptions     Pending Prescriptions Disp Refills    amphetamine-dextroamphetamine (ADDERALL, 20MG,) 20 MG tablet 30 tablet 0     Sig: Take 1 tablet by mouth daily for 30 days.

## 2020-05-04 RX ORDER — DEXTROAMPHETAMINE SACCHARATE, AMPHETAMINE ASPARTATE, DEXTROAMPHETAMINE SULFATE AND AMPHETAMINE SULFATE 5; 5; 5; 5 MG/1; MG/1; MG/1; MG/1
20 TABLET ORAL DAILY
Qty: 30 TABLET | Refills: 0 | Status: SHIPPED | OUTPATIENT
Start: 2020-05-04 | End: 2020-06-02 | Stop reason: SDUPTHER

## 2020-06-02 ENCOUNTER — OFFICE VISIT (OUTPATIENT)
Dept: FAMILY MEDICINE CLINIC | Age: 45
End: 2020-06-02
Payer: COMMERCIAL

## 2020-06-02 VITALS
OXYGEN SATURATION: 98 % | BODY MASS INDEX: 23.14 KG/M2 | SYSTOLIC BLOOD PRESSURE: 108 MMHG | WEIGHT: 144 LBS | HEIGHT: 66 IN | HEART RATE: 74 BPM | DIASTOLIC BLOOD PRESSURE: 74 MMHG

## 2020-06-02 LAB
A/G RATIO: 1.7 (ref 1.1–2.2)
ALBUMIN SERPL-MCNC: 4 G/DL (ref 3.4–5)
ALP BLD-CCNC: 69 U/L (ref 40–129)
ALT SERPL-CCNC: 16 U/L (ref 10–40)
ANION GAP SERPL CALCULATED.3IONS-SCNC: 8 MMOL/L (ref 3–16)
AST SERPL-CCNC: 19 U/L (ref 15–37)
BASOPHILS ABSOLUTE: 0 K/UL (ref 0–0.2)
BASOPHILS RELATIVE PERCENT: 0.5 %
BILIRUB SERPL-MCNC: 0.5 MG/DL (ref 0–1)
BUN BLDV-MCNC: 10 MG/DL (ref 7–20)
CALCIUM SERPL-MCNC: 9.1 MG/DL (ref 8.3–10.6)
CHLORIDE BLD-SCNC: 106 MMOL/L (ref 99–110)
CHOLESTEROL, TOTAL: 188 MG/DL (ref 0–199)
CO2: 27 MMOL/L (ref 21–32)
CREAT SERPL-MCNC: 0.6 MG/DL (ref 0.6–1.1)
EOSINOPHILS ABSOLUTE: 0.1 K/UL (ref 0–0.6)
EOSINOPHILS RELATIVE PERCENT: 2 %
GFR AFRICAN AMERICAN: >60
GFR NON-AFRICAN AMERICAN: >60
GLOBULIN: 2.3 G/DL
GLUCOSE BLD-MCNC: 104 MG/DL (ref 70–99)
HCT VFR BLD CALC: 38.8 % (ref 36–48)
HDLC SERPL-MCNC: 51 MG/DL (ref 40–60)
HEMOGLOBIN: 12.7 G/DL (ref 12–16)
LDL CHOLESTEROL CALCULATED: 119 MG/DL
LYMPHOCYTES ABSOLUTE: 2.2 K/UL (ref 1–5.1)
LYMPHOCYTES RELATIVE PERCENT: 37.1 %
MCH RBC QN AUTO: 30.1 PG (ref 26–34)
MCHC RBC AUTO-ENTMCNC: 32.8 G/DL (ref 31–36)
MCV RBC AUTO: 92 FL (ref 80–100)
MONOCYTES ABSOLUTE: 0.4 K/UL (ref 0–1.3)
MONOCYTES RELATIVE PERCENT: 7.2 %
NEUTROPHILS ABSOLUTE: 3.1 K/UL (ref 1.7–7.7)
NEUTROPHILS RELATIVE PERCENT: 53.2 %
PDW BLD-RTO: 13.6 % (ref 12.4–15.4)
PLATELET # BLD: 207 K/UL (ref 135–450)
PMV BLD AUTO: 9.5 FL (ref 5–10.5)
POTASSIUM SERPL-SCNC: 4.8 MMOL/L (ref 3.5–5.1)
RBC # BLD: 4.21 M/UL (ref 4–5.2)
SODIUM BLD-SCNC: 141 MMOL/L (ref 136–145)
TOTAL PROTEIN: 6.3 G/DL (ref 6.4–8.2)
TRIGL SERPL-MCNC: 88 MG/DL (ref 0–150)
VLDLC SERPL CALC-MCNC: 18 MG/DL
WBC # BLD: 5.9 K/UL (ref 4–11)

## 2020-06-02 PROCEDURE — 99214 OFFICE O/P EST MOD 30 MIN: CPT | Performed by: FAMILY MEDICINE

## 2020-06-02 RX ORDER — OXYBUTYNIN CHLORIDE 5 MG/1
5 TABLET ORAL 3 TIMES DAILY
Qty: 90 TABLET | Refills: 2 | Status: SHIPPED | OUTPATIENT
Start: 2020-06-02 | End: 2020-12-03

## 2020-06-02 RX ORDER — DOXYCYCLINE HYCLATE 100 MG/1
100 CAPSULE ORAL 2 TIMES DAILY
Qty: 180 CAPSULE | Refills: 1 | Status: SHIPPED | OUTPATIENT
Start: 2020-06-02 | End: 2020-11-17 | Stop reason: SDUPTHER

## 2020-06-02 RX ORDER — DEXTROAMPHETAMINE SACCHARATE, AMPHETAMINE ASPARTATE, DEXTROAMPHETAMINE SULFATE AND AMPHETAMINE SULFATE 5; 5; 5; 5 MG/1; MG/1; MG/1; MG/1
20 TABLET ORAL DAILY
Qty: 30 TABLET | Refills: 0 | Status: SHIPPED | OUTPATIENT
Start: 2020-06-02 | End: 2020-07-06 | Stop reason: SDUPTHER

## 2020-06-02 ASSESSMENT — ENCOUNTER SYMPTOMS
CHEST TIGHTNESS: 0
COUGH: 0
BLOOD IN STOOL: 0
ABDOMINAL PAIN: 0
CONSTIPATION: 0
SHORTNESS OF BREATH: 0

## 2020-07-07 RX ORDER — DEXTROAMPHETAMINE SACCHARATE, AMPHETAMINE ASPARTATE, DEXTROAMPHETAMINE SULFATE AND AMPHETAMINE SULFATE 5; 5; 5; 5 MG/1; MG/1; MG/1; MG/1
20 TABLET ORAL DAILY
Qty: 30 TABLET | Refills: 0 | Status: SHIPPED | OUTPATIENT
Start: 2020-07-07 | End: 2020-08-03 | Stop reason: SDUPTHER

## 2020-07-07 NOTE — TELEPHONE ENCOUNTER
Refill Request - Controlled Substance    Last Seen: 6/2/2020       Last Written: #30  0rf  6/2/2020    Last UDS: no    OARRS Run On: no    Med Agreement Signed On: 7/16/2015    Next Appointment: 9/2/2020        Requested Prescriptions     Pending Prescriptions Disp Refills    amphetamine-dextroamphetamine (ADDERALL, 20MG,) 20 MG tablet 30 tablet 0     Sig: Take 1 tablet by mouth daily for 30 days.

## 2020-08-03 RX ORDER — DEXTROAMPHETAMINE SACCHARATE, AMPHETAMINE ASPARTATE, DEXTROAMPHETAMINE SULFATE AND AMPHETAMINE SULFATE 5; 5; 5; 5 MG/1; MG/1; MG/1; MG/1
20 TABLET ORAL DAILY
Qty: 30 TABLET | Refills: 0 | Status: SHIPPED | OUTPATIENT
Start: 2020-08-03 | End: 2020-09-07 | Stop reason: SDUPTHER

## 2020-08-04 ENCOUNTER — TELEPHONE (OUTPATIENT)
Dept: FAMILY MEDICINE CLINIC | Age: 45
End: 2020-08-04

## 2020-09-08 RX ORDER — DEXTROAMPHETAMINE SACCHARATE, AMPHETAMINE ASPARTATE, DEXTROAMPHETAMINE SULFATE AND AMPHETAMINE SULFATE 5; 5; 5; 5 MG/1; MG/1; MG/1; MG/1
20 TABLET ORAL DAILY
Qty: 30 TABLET | Refills: 0 | Status: SHIPPED | OUTPATIENT
Start: 2020-09-08 | End: 2020-09-11 | Stop reason: SDUPTHER

## 2020-09-11 ENCOUNTER — TELEPHONE (OUTPATIENT)
Dept: FAMILY MEDICINE CLINIC | Age: 45
End: 2020-09-11

## 2020-09-11 ENCOUNTER — PATIENT MESSAGE (OUTPATIENT)
Dept: FAMILY MEDICINE CLINIC | Age: 45
End: 2020-09-11

## 2020-09-11 RX ORDER — DEXTROAMPHETAMINE SACCHARATE, AMPHETAMINE ASPARTATE, DEXTROAMPHETAMINE SULFATE AND AMPHETAMINE SULFATE 5; 5; 5; 5 MG/1; MG/1; MG/1; MG/1
20 TABLET ORAL DAILY
Qty: 30 TABLET | Refills: 0 | Status: CANCELLED | OUTPATIENT
Start: 2020-09-11 | End: 2020-10-11

## 2020-09-11 RX ORDER — DEXTROAMPHETAMINE SACCHARATE, AMPHETAMINE ASPARTATE, DEXTROAMPHETAMINE SULFATE AND AMPHETAMINE SULFATE 5; 5; 5; 5 MG/1; MG/1; MG/1; MG/1
20 TABLET ORAL DAILY
Qty: 30 TABLET | Refills: 0 | Status: SHIPPED | OUTPATIENT
Start: 2020-09-11 | End: 2020-10-16 | Stop reason: SDUPTHER

## 2020-09-11 RX ORDER — DEXTROAMPHETAMINE SACCHARATE, AMPHETAMINE ASPARTATE, DEXTROAMPHETAMINE SULFATE AND AMPHETAMINE SULFATE 5; 5; 5; 5 MG/1; MG/1; MG/1; MG/1
20 TABLET ORAL DAILY
Qty: 30 TABLET | Refills: 0 | OUTPATIENT
Start: 2020-09-11 | End: 2020-10-11

## 2020-09-11 NOTE — TELEPHONE ENCOUNTER
----- Message from Arvind Vuong sent at 9/11/2020  4:03 PM EDT -----  Subject: Refill Request    QUESTIONS  Name of Medication? amphetamine-dextroamphetamine (ADDERALL   20MG  ) 20 MG tablet  Patient-reported dosage and instructions? Takes 1 pill a day   in the morning. How many days do you have left? 0  Preferred Pharmacy? [address] [place] [id number] [facility]  Pharmacy phone number (if available)? [phone]  Additional Information for Provider? Patient stated prescription refill   was denied   stated the doctor didnt require her to come in to be seen to get it   refilled   which was why appt was made in december. Wants to see about getting   script filled asap  ---------------------------------------------------------------------------  --------------  CALL BACK INFO  What is the best way for the office to contact you? OK to leave message on   voicemail  Preferred Call Back Phone Number?  [phone]

## 2020-10-16 RX ORDER — DEXTROAMPHETAMINE SACCHARATE, AMPHETAMINE ASPARTATE, DEXTROAMPHETAMINE SULFATE AND AMPHETAMINE SULFATE 5; 5; 5; 5 MG/1; MG/1; MG/1; MG/1
20 TABLET ORAL DAILY
Qty: 30 TABLET | Refills: 0 | Status: SHIPPED | OUTPATIENT
Start: 2020-10-16 | End: 2020-11-17 | Stop reason: SDUPTHER

## 2020-10-16 NOTE — TELEPHONE ENCOUNTER
Refill Request     Last Seen: 6/2/2020    Last Written: #30  0rf  9/11/2020    Next Appointment:   Future Appointments   Date Time Provider Chente Rosaura   12/3/2020  8:00 AM DO JOSELINE Malcolm [facility]       Appointment scheduled      Requested Prescriptions     Pending Prescriptions Disp Refills    amphetamine-dextroamphetamine (ADDERALL, 20MG,) 20 MG tablet 30 tablet 0     Sig: Take 1 tablet by mouth daily for 30 days.

## 2020-11-17 RX ORDER — DEXTROAMPHETAMINE SACCHARATE, AMPHETAMINE ASPARTATE, DEXTROAMPHETAMINE SULFATE AND AMPHETAMINE SULFATE 5; 5; 5; 5 MG/1; MG/1; MG/1; MG/1
20 TABLET ORAL DAILY
Qty: 30 TABLET | Refills: 0 | Status: SHIPPED | OUTPATIENT
Start: 2020-11-17 | End: 2020-12-16 | Stop reason: SDUPTHER

## 2020-11-17 RX ORDER — DOXYCYCLINE HYCLATE 100 MG/1
100 CAPSULE ORAL 2 TIMES DAILY
Qty: 180 CAPSULE | Refills: 1 | Status: SHIPPED | OUTPATIENT
Start: 2020-11-17 | End: 2021-01-31 | Stop reason: SDUPTHER

## 2020-11-17 NOTE — TELEPHONE ENCOUNTER
.  Last office visit 6/2/2020     Last written 10/16/2020 30 with 0   Doxycyline 6/2/2020 180 with 1      Next office visit scheduled 12/3/2020    Requested Prescriptions     Pending Prescriptions Disp Refills    doxycycline hyclate (VIBRAMYCIN) 100 MG capsule 180 capsule 1     Sig: Take 1 capsule by mouth 2 times daily rosacea    amphetamine-dextroamphetamine (ADDERALL, 20MG,) 20 MG tablet 30 tablet 0     Sig: Take 1 tablet by mouth daily for 30 days.

## 2020-12-03 ENCOUNTER — VIRTUAL VISIT (OUTPATIENT)
Dept: FAMILY MEDICINE CLINIC | Age: 45
End: 2020-12-03
Payer: COMMERCIAL

## 2020-12-03 PROCEDURE — 99442 PR PHYS/QHP TELEPHONE EVALUATION 11-20 MIN: CPT | Performed by: FAMILY MEDICINE

## 2020-12-03 ASSESSMENT — ENCOUNTER SYMPTOMS
VOMITING: 0
ABDOMINAL PAIN: 0
DIARRHEA: 0
NAUSEA: 0
SHORTNESS OF BREATH: 0
COUGH: 0
CONSTIPATION: 0

## 2020-12-03 NOTE — PROGRESS NOTES
patient is not nervous/anxious. Objective:   Physical Exam      Physical Exam  Neurological:      Mental Status: She is alert and oriented to person, place, and time. Psychiatric:         Mood and Affect: Mood normal.         Behavior: Behavior normal.         Thought Content: Thought content normal.         Judgment: Judgment normal.         Assessment:      1. Attention deficit disorder, unspecified hyperactivity presence    2. OAB (overactive bladder)    3. Rosacea    4. Migraine without aura and without status migrainosus, not intractable            Plan:      Libia Raymond was seen today for 6 month follow-up. Diagnoses and all orders for this visit:    Attention deficit disorder, unspecified hyperactivity presence  Continue medications and try and reduce when possible. Monitor blood pressure and heart rate at home. Call if any palpitations. OAB (overactive bladder)  Call me if overactive bladder becomes more of an issue. Rosacea  Continue medications  Migraine without aura and without status migrainosus, not intractable  Continue the 2 Tylenol as needed for the headaches since they do work-we did discuss a migraine tablet and a tablet to prevent headaches so let me know if you have an issue in 1 to investigate further. This has been a 12 to 14-minute telephone visit  Call back and set up an appointment for about 3 months.         Varinder Castillo, DO

## 2020-12-03 NOTE — PATIENT INSTRUCTIONS
Attention deficit disorder, unspecified hyperactivity presence  Continue medications and try and reduce when possible. Monitor blood pressure and heart rate at home. Call if any palpitations. OAB (overactive bladder)  Call me if overactive bladder becomes more of an issue. Rosacea  Continue medications  Migraine without aura and without status migrainosus, not intractable  Continue the 2 Tylenol as needed for the headaches since they do work-we did discuss a migraine tablet and a tablet to prevent headaches so let me know if you have an issue in 1 to investigate further. This has been a 12 to 14-minute telephone visit  Call back and set up an appointment for about 3 months.         Varinder Castillo, DO

## 2020-12-17 RX ORDER — DEXTROAMPHETAMINE SACCHARATE, AMPHETAMINE ASPARTATE, DEXTROAMPHETAMINE SULFATE AND AMPHETAMINE SULFATE 5; 5; 5; 5 MG/1; MG/1; MG/1; MG/1
20 TABLET ORAL DAILY
Qty: 30 TABLET | Refills: 0 | Status: SHIPPED | OUTPATIENT
Start: 2020-12-17 | End: 2021-01-31 | Stop reason: SDUPTHER

## 2020-12-17 NOTE — TELEPHONE ENCOUNTER
Refill Request - Controlled Substance    Last Seen: 12/3/2020       Last Written: #30  0rf  11/17/2020    Last UDS: no    Med Agreement Signed On: 7/16/2015    Next Appointment: Visit date not found        Requested Prescriptions     Pending Prescriptions Disp Refills    amphetamine-dextroamphetamine (ADDERALL, 20MG,) 20 MG tablet 30 tablet 0     Sig: Take 1 tablet by mouth daily for 30 days.

## 2021-03-17 DIAGNOSIS — F98.8 ATTENTION DEFICIT DISORDER, UNSPECIFIED HYPERACTIVITY PRESENCE: ICD-10-CM

## 2021-03-17 RX ORDER — DEXTROAMPHETAMINE SACCHARATE, AMPHETAMINE ASPARTATE, DEXTROAMPHETAMINE SULFATE AND AMPHETAMINE SULFATE 5; 5; 5; 5 MG/1; MG/1; MG/1; MG/1
20 TABLET ORAL DAILY
Qty: 30 TABLET | Refills: 0 | Status: SHIPPED | OUTPATIENT
Start: 2021-03-17 | End: 2021-04-15 | Stop reason: SDUPTHER

## 2021-03-17 NOTE — TELEPHONE ENCOUNTER
Refill Request - Controlled Substance    Last Seen: 12/3/2020    Last Written: 02/01/21    Last UDS: No resulted procedures found    Med Agreement Signed On: 7/16/15    Next Appointment: No Future appointments         Requested Prescriptions     Pending Prescriptions Disp Refills    amphetamine-dextroamphetamine (ADDERALL, 20MG,) 20 MG tablet 30 tablet 0     Sig: Take 1 tablet by mouth daily for 30 days.

## 2021-04-15 DIAGNOSIS — F98.8 ATTENTION DEFICIT DISORDER, UNSPECIFIED HYPERACTIVITY PRESENCE: ICD-10-CM

## 2021-04-16 RX ORDER — DEXTROAMPHETAMINE SACCHARATE, AMPHETAMINE ASPARTATE, DEXTROAMPHETAMINE SULFATE AND AMPHETAMINE SULFATE 5; 5; 5; 5 MG/1; MG/1; MG/1; MG/1
20 TABLET ORAL DAILY
Qty: 30 TABLET | Refills: 0 | Status: SHIPPED | OUTPATIENT
Start: 2021-04-16 | End: 2021-04-22

## 2021-04-16 NOTE — TELEPHONE ENCOUNTER
Refill Request     Last Seen: 12/3/2020    Last Written: 3/17/2021    Next Appointment:   No future appointments. Requested Prescriptions     Pending Prescriptions Disp Refills    amphetamine-dextroamphetamine (ADDERALL, 20MG,) 20 MG tablet 30 tablet 0     Sig: Take 1 tablet by mouth daily for 30 days.

## 2021-04-22 DIAGNOSIS — F98.8 ATTENTION DEFICIT DISORDER, UNSPECIFIED HYPERACTIVITY PRESENCE: Primary | ICD-10-CM

## 2021-04-22 RX ORDER — DEXTROAMPHETAMINE SACCHARATE, AMPHETAMINE ASPARTATE MONOHYDRATE, DEXTROAMPHETAMINE SULFATE AND AMPHETAMINE SULFATE 5; 5; 5; 5 MG/1; MG/1; MG/1; MG/1
20 CAPSULE, EXTENDED RELEASE ORAL EVERY MORNING
Qty: 30 CAPSULE | Refills: 0 | Status: SHIPPED | OUTPATIENT
Start: 2021-04-22 | End: 2021-05-22 | Stop reason: SDUPTHER

## 2021-07-11 DIAGNOSIS — F98.8 ATTENTION DEFICIT DISORDER, UNSPECIFIED HYPERACTIVITY PRESENCE: ICD-10-CM

## 2021-07-12 RX ORDER — DEXTROAMPHETAMINE SACCHARATE, AMPHETAMINE ASPARTATE MONOHYDRATE, DEXTROAMPHETAMINE SULFATE AND AMPHETAMINE SULFATE 5; 5; 5; 5 MG/1; MG/1; MG/1; MG/1
20 CAPSULE, EXTENDED RELEASE ORAL EVERY MORNING
Qty: 30 CAPSULE | Refills: 0 | Status: SHIPPED | OUTPATIENT
Start: 2021-07-12 | End: 2021-08-12 | Stop reason: SDUPTHER

## 2021-07-12 NOTE — TELEPHONE ENCOUNTER
Refill Request     Last Seen: Last Seen Department: 12/3/2020  Last Seen by PCP: 12/3/2020    Last Written: 5/25/2021    Next Appointment:   No future appointments. Requested Prescriptions     Pending Prescriptions Disp Refills    amphetamine-dextroamphetamine (ADDERALL XR) 20 MG extended release capsule 30 capsule 0     Sig: Take 1 capsule by mouth every morning for 30 days.

## 2021-09-15 DIAGNOSIS — F98.8 ATTENTION DEFICIT DISORDER, UNSPECIFIED HYPERACTIVITY PRESENCE: ICD-10-CM

## 2021-09-16 RX ORDER — DOXYCYCLINE HYCLATE 100 MG/1
100 CAPSULE ORAL 2 TIMES DAILY
Qty: 180 CAPSULE | Refills: 0 | Status: SHIPPED | OUTPATIENT
Start: 2021-09-16 | End: 2021-11-27 | Stop reason: SDUPTHER

## 2021-09-16 RX ORDER — DEXTROAMPHETAMINE SACCHARATE, AMPHETAMINE ASPARTATE MONOHYDRATE, DEXTROAMPHETAMINE SULFATE AND AMPHETAMINE SULFATE 5; 5; 5; 5 MG/1; MG/1; MG/1; MG/1
20 CAPSULE, EXTENDED RELEASE ORAL EVERY MORNING
Qty: 30 CAPSULE | Refills: 0 | Status: SHIPPED | OUTPATIENT
Start: 2021-09-16 | End: 2021-10-15 | Stop reason: SDUPTHER

## 2021-09-16 NOTE — TELEPHONE ENCOUNTER
Refill Request - Controlled Substance    Last Seen Department: 12/3/2020  Last Seen by PCP: 12/3/2020    Last Written: 8/12/21    Last UDS: Needs UDS    Med Agreement Signed On: 7/16/15    Next Appointment: 10/5/2021    Future appointment scheduled    Requested Prescriptions     Pending Prescriptions Disp Refills    doxycycline hyclate (VIBRAMYCIN) 100 MG capsule 180 capsule 0     Sig: Take 1 capsule by mouth 2 times daily rosacea    amphetamine-dextroamphetamine (ADDERALL XR) 20 MG extended release capsule 30 capsule 0     Sig: Take 1 capsule by mouth every morning for 30 days.

## 2021-10-05 ENCOUNTER — OFFICE VISIT (OUTPATIENT)
Dept: FAMILY MEDICINE CLINIC | Age: 46
End: 2021-10-05
Payer: COMMERCIAL

## 2021-10-05 VITALS
OXYGEN SATURATION: 99 % | WEIGHT: 144 LBS | SYSTOLIC BLOOD PRESSURE: 132 MMHG | DIASTOLIC BLOOD PRESSURE: 70 MMHG | HEART RATE: 90 BPM | BODY MASS INDEX: 23.24 KG/M2

## 2021-10-05 DIAGNOSIS — F98.8 ATTENTION DEFICIT DISORDER (ADD) WITHOUT HYPERACTIVITY: Primary | ICD-10-CM

## 2021-10-05 PROCEDURE — 99213 OFFICE O/P EST LOW 20 MIN: CPT | Performed by: FAMILY MEDICINE

## 2021-10-05 SDOH — ECONOMIC STABILITY: FOOD INSECURITY: WITHIN THE PAST 12 MONTHS, THE FOOD YOU BOUGHT JUST DIDN'T LAST AND YOU DIDN'T HAVE MONEY TO GET MORE.: NEVER TRUE

## 2021-10-05 SDOH — ECONOMIC STABILITY: FOOD INSECURITY: WITHIN THE PAST 12 MONTHS, YOU WORRIED THAT YOUR FOOD WOULD RUN OUT BEFORE YOU GOT MONEY TO BUY MORE.: NEVER TRUE

## 2021-10-05 ASSESSMENT — SOCIAL DETERMINANTS OF HEALTH (SDOH): HOW HARD IS IT FOR YOU TO PAY FOR THE VERY BASICS LIKE FOOD, HOUSING, MEDICAL CARE, AND HEATING?: NOT HARD AT ALL

## 2021-10-05 ASSESSMENT — PATIENT HEALTH QUESTIONNAIRE - PHQ9
SUM OF ALL RESPONSES TO PHQ QUESTIONS 1-9: 0
2. FEELING DOWN, DEPRESSED OR HOPELESS: 0
SUM OF ALL RESPONSES TO PHQ QUESTIONS 1-9: 0
1. LITTLE INTEREST OR PLEASURE IN DOING THINGS: 0
SUM OF ALL RESPONSES TO PHQ QUESTIONS 1-9: 0
SUM OF ALL RESPONSES TO PHQ9 QUESTIONS 1 & 2: 0

## 2021-10-05 NOTE — PROGRESS NOTES
Andi Barthel is a 55 y.o. female    Chief Complaint   Patient presents with    Medication Refill     Adderall- ADD       HPI:    HPI    This is a new patient to me. ADD. This is a chronic issue. There is no hyperactivity present. She is doing well with the 20 mg extended release. No palpitations or insomnia. Previously she had side effects with the 20 mg immediate release. She likes the extended release much better. ROS:    Review of Systems   Cardiovascular: Negative for palpitations. Psychiatric/Behavioral: Negative for sleep disturbance. /70   Pulse 90   Wt 144 lb (65.3 kg)   LMP 09/29/2021   SpO2 99%   BMI 23.24 kg/m²     Physical Exam:    Physical Exam  Constitutional:       General: She is not in acute distress. Appearance: Normal appearance. She is not ill-appearing or toxic-appearing. HENT:      Head: Normocephalic. Neurological:      Mental Status: She is alert. Psychiatric:         Mood and Affect: Mood normal.         Behavior: Behavior normal.         Thought Content: Thought content normal.         Current Outpatient Medications   Medication Sig Dispense Refill    doxycycline hyclate (VIBRAMYCIN) 100 MG capsule Take 1 capsule by mouth 2 times daily rosacea 180 capsule 0    amphetamine-dextroamphetamine (ADDERALL XR) 20 MG extended release capsule Take 1 capsule by mouth every morning for 30 days. 30 capsule 0     No current facility-administered medications for this visit. Assessment:    1. Attention deficit disorder (ADD) without hyperactivity        Plan:    1. Attention deficit disorder (ADD) without hyperactivity  Stable. Continue current medications. Return in about 3 months (around 1/5/2022) for ADHD.

## 2021-10-15 DIAGNOSIS — F98.8 ATTENTION DEFICIT DISORDER, UNSPECIFIED HYPERACTIVITY PRESENCE: ICD-10-CM

## 2021-10-15 RX ORDER — DEXTROAMPHETAMINE SACCHARATE, AMPHETAMINE ASPARTATE MONOHYDRATE, DEXTROAMPHETAMINE SULFATE AND AMPHETAMINE SULFATE 5; 5; 5; 5 MG/1; MG/1; MG/1; MG/1
20 CAPSULE, EXTENDED RELEASE ORAL EVERY MORNING
Qty: 30 CAPSULE | Refills: 0 | Status: SHIPPED | OUTPATIENT
Start: 2021-10-15 | End: 2021-11-27 | Stop reason: SDUPTHER

## 2021-10-15 NOTE — TELEPHONE ENCOUNTER
Refill Request - Controlled Substance    Last Seen Department: 10/5/2021  Last Seen by PCP: 12/3/20    Last Written: 9/16/21 30 capsule 0 refill    Last UDS: n/a    Med Agreement Signed On: 7/16/15    Next Appointment: 1/12/2022      Future appointment scheduled    Requested Prescriptions     Pending Prescriptions Disp Refills    amphetamine-dextroamphetamine (ADDERALL XR) 20 MG extended release capsule 30 capsule 0     Sig: Take 1 capsule by mouth every morning for 30 days.

## 2021-11-27 DIAGNOSIS — F98.8 ATTENTION DEFICIT DISORDER, UNSPECIFIED HYPERACTIVITY PRESENCE: ICD-10-CM

## 2021-11-28 NOTE — TELEPHONE ENCOUNTER
.  Refill Request   No uds on file  NO med contract on file     Last Seen: Last Seen Department: 10/5/2021  Last Seen by PCP: Visit date not found    Last Written: 10/15/21 30 with 0     Next Appointment:   Future Appointments   Date Time Provider Chente Kaplan   1/12/2022  9:00 AM DO JOSELINE [facility]           Requested Prescriptions     Pending Prescriptions Disp Refills    doxycycline hyclate (VIBRAMYCIN) 100 MG capsule 180 capsule 0     Sig: Take 1 capsule by mouth 2 times daily rosacea    amphetamine-dextroamphetamine (ADDERALL XR) 20 MG extended release capsule 30 capsule 0     Sig: Take 1 capsule by mouth every morning for 30 days.

## 2021-11-29 RX ORDER — DEXTROAMPHETAMINE SACCHARATE, AMPHETAMINE ASPARTATE MONOHYDRATE, DEXTROAMPHETAMINE SULFATE AND AMPHETAMINE SULFATE 5; 5; 5; 5 MG/1; MG/1; MG/1; MG/1
20 CAPSULE, EXTENDED RELEASE ORAL EVERY MORNING
Qty: 30 CAPSULE | Refills: 0 | Status: SHIPPED | OUTPATIENT
Start: 2021-11-29 | End: 2022-01-04 | Stop reason: SDUPTHER

## 2021-11-29 RX ORDER — DOXYCYCLINE HYCLATE 100 MG/1
100 CAPSULE ORAL 2 TIMES DAILY
Qty: 180 CAPSULE | Refills: 0 | Status: SHIPPED | OUTPATIENT
Start: 2021-11-29 | End: 2022-03-21 | Stop reason: SDUPTHER

## 2022-01-19 ENCOUNTER — TELEMEDICINE (OUTPATIENT)
Dept: FAMILY MEDICINE CLINIC | Age: 47
End: 2022-01-19
Payer: COMMERCIAL

## 2022-01-19 DIAGNOSIS — F98.8 ATTENTION DEFICIT DISORDER (ADD) WITHOUT HYPERACTIVITY: Primary | ICD-10-CM

## 2022-01-19 DIAGNOSIS — E78.41 ELEVATED LIPOPROTEIN(A): ICD-10-CM

## 2022-01-19 DIAGNOSIS — E55.9 VITAMIN D DEFICIENCY: ICD-10-CM

## 2022-01-19 DIAGNOSIS — Z12.11 COLON CANCER SCREENING: ICD-10-CM

## 2022-01-19 DIAGNOSIS — Z11.4 ENCOUNTER FOR SCREENING FOR HIV: ICD-10-CM

## 2022-01-19 DIAGNOSIS — Z11.59 ENCOUNTER FOR HEPATITIS C SCREENING TEST FOR LOW RISK PATIENT: ICD-10-CM

## 2022-01-19 PROCEDURE — 99213 OFFICE O/P EST LOW 20 MIN: CPT | Performed by: NURSE PRACTITIONER

## 2022-01-19 ASSESSMENT — ENCOUNTER SYMPTOMS
BLOOD IN STOOL: 0
WHEEZING: 0
CHEST TIGHTNESS: 0
CONSTIPATION: 0
NAUSEA: 0
ABDOMINAL DISTENTION: 0
VOMITING: 0
ABDOMINAL PAIN: 0
COUGH: 0
DIARRHEA: 0
SHORTNESS OF BREATH: 0

## 2022-01-19 NOTE — PROGRESS NOTES
Elissa Godwin (:  1975) is a 55 y.o. female,Established patient, here for evaluation of the following chief complaint(s): ADHD         ASSESSMENT/PLAN:  1. Attention deficit disorder (ADD) without hyperactivity  -     COMPREHENSIVE METABOLIC PANEL; Future  -     CBC; Future  -     TSH with Reflex; Future  -Stable, continue current regimen of Adderall XR 20 mg capsules. -Reduce when possible. 2. Encounter for screening for HIV  -     HIV-1 AND HIV-2 ANTIBODIES; Future  -Screening    3. Encounter for hepatitis C screening test for low risk patient  -     HEPATITIS C ANTIBODY; Future  -Screening    4. Vitamin D deficiency  -     VITAMIN D 25 HYDROXY; Future  -hx of vit d def.   -Not currently taking supplement. 5. Elevated lipoprotein(a)  -     LIPID PANEL; Future  -Elevated LDL levels in . Due for Lipid panel.   -Instructed pt to come in for labs fasting. 6. Colon cancer screening  -Cologuard orders placed. Return in about 3 months (around 2022), or if symptoms worsen or fail to improve, for ADHD. SUBJECTIVE/OBJECTIVE:  HPI   Presenting for follow up on ADHD. ADHD-primarily attention deficit. This is a chronic issue. Taking Adderall XR 20 mg. Doing well. Takes medication most days. No palpitations or insomnia. Appetite is good, weight stable. Previously she had side effects with the 20 mg immediate release. She likes the extended release much better. Health Maintenance   Colon Cancer Screening-Not had before. is interested in Cologuard. Denies history of hemorrhoids. Denies family or personal history of colon cancer. Cervical Cancer screening-Due for Pap-Follows with OB/Gyn-will follow up with them  HIV/Hep C screening today. Review of Systems   Constitutional: Negative for activity change, appetite change, chills, diaphoresis, fatigue, fever and unexpected weight change. HENT: Negative.     Respiratory: Negative for cough, chest tightness, shortness of breath and wheezing. Cardiovascular: Negative for chest pain, palpitations and leg swelling. Gastrointestinal: Negative for abdominal distention, abdominal pain, blood in stool, constipation, diarrhea, nausea and vomiting. Genitourinary: Negative. Negative for difficulty urinating and dysuria. Musculoskeletal: Negative. Negative for gait problem. Neurological: Negative. Negative for dizziness, syncope, weakness, light-headedness, numbness and headaches. Psychiatric/Behavioral: Negative.         Patient-Reported Vitals 1/19/2022   Patient-Reported Weight 140   Patient-Reported Height 56        Physical Exam    [INSTRUCTIONS:  \"[x]\" Indicates a positive item  \"[]\" Indicates a negative item  -- DELETE ALL ITEMS NOT EXAMINED]    Constitutional: [x] Appears well-developed and well-nourished [x] No apparent distress      [] Abnormal -     Mental status: [x] Alert and awake  [x] Oriented to person/place/time [x] Able to follow commands    [] Abnormal -     Eyes:   EOM    [x]  Normal    [] Abnormal -   Sclera  [x]  Normal    [] Abnormal -          Discharge [x]  None visible   [] Abnormal -     HENT: [x] Normocephalic, atraumatic  [] Abnormal -   [x] Mouth/Throat: Mucous membranes are moist    External Ears [x] Normal  [] Abnormal -    Neck: [x] No visualized mass [] Abnormal -     Pulmonary/Chest: [x] Respiratory effort normal   [x] No visualized signs of difficulty breathing or respiratory distress        [] Abnormal -      Musculoskeletal:   [x] Normal gait with no signs of ataxia         [x] Normal range of motion of neck        [] Abnormal -     Neurological:        [x] No Facial Asymmetry (Cranial nerve 7 motor function) (limited exam due to video visit)          [x] No gaze palsy        [] Abnormal -          Skin:        [x] No significant exanthematous lesions or discoloration noted on facial skin         [] Abnormal -            Psychiatric:       [x] Normal Affect [] Abnormal -        [x] No Hallucinations    Other pertinent observable physical exam findings:-      Zoe Doshi, was evaluated through a synchronous (real-time) audio-video encounter. The patient (or guardian if applicable) is aware that this is a billable service. Verbal consent to proceed has been obtained within the past 12 months. The visit was conducted pursuant to the emergency declaration under the [address] [place] [address] authority and the [employer] [place] and [employer] General Act. Patient identification was verified, and a caregiver was present when appropriate. The patient was located in a state where the provider was credentialed to provide care. An electronic signature was used to authenticate this note. --Caden Velázquez, CARISSA - CNP

## 2022-02-16 DIAGNOSIS — F98.8 ATTENTION DEFICIT DISORDER, UNSPECIFIED HYPERACTIVITY PRESENCE: ICD-10-CM

## 2022-02-17 RX ORDER — DEXTROAMPHETAMINE SACCHARATE, AMPHETAMINE ASPARTATE MONOHYDRATE, DEXTROAMPHETAMINE SULFATE AND AMPHETAMINE SULFATE 5; 5; 5; 5 MG/1; MG/1; MG/1; MG/1
20 CAPSULE, EXTENDED RELEASE ORAL EVERY MORNING
Qty: 30 CAPSULE | Refills: 0 | Status: SHIPPED | OUTPATIENT
Start: 2022-02-17 | End: 2022-03-21 | Stop reason: SDUPTHER

## 2022-02-17 NOTE — TELEPHONE ENCOUNTER
.  Refill Request - Controlled Substance    Last Seen Department: 1/19/2022  Last Seen by PCP: 2/26/2020    Last Written: 1-5-22 30 with 0     Last UDS: no uds    Med Agreement Signed On: 7-16-15    Next Appointment: Visit date not found    Requested Prescriptions     Pending Prescriptions Disp Refills    amphetamine-dextroamphetamine (ADDERALL XR) 20 MG extended release capsule 30 capsule 0     Sig: Take 1 capsule by mouth every morning for 30 days.

## 2022-03-21 DIAGNOSIS — F98.8 ATTENTION DEFICIT DISORDER, UNSPECIFIED HYPERACTIVITY PRESENCE: ICD-10-CM

## 2022-03-22 RX ORDER — DEXTROAMPHETAMINE SACCHARATE, AMPHETAMINE ASPARTATE MONOHYDRATE, DEXTROAMPHETAMINE SULFATE AND AMPHETAMINE SULFATE 5; 5; 5; 5 MG/1; MG/1; MG/1; MG/1
20 CAPSULE, EXTENDED RELEASE ORAL EVERY MORNING
Qty: 30 CAPSULE | Refills: 0 | Status: SHIPPED | OUTPATIENT
Start: 2022-03-22 | End: 2022-04-18 | Stop reason: SDUPTHER

## 2022-03-22 RX ORDER — DOXYCYCLINE HYCLATE 100 MG/1
100 CAPSULE ORAL 2 TIMES DAILY
Qty: 180 CAPSULE | Refills: 1 | Status: SHIPPED | OUTPATIENT
Start: 2022-03-22 | End: 2022-08-01 | Stop reason: SDUPTHER

## 2022-03-22 NOTE — TELEPHONE ENCOUNTER
.  Refill Request - Controlled Substance    Last Seen Department: 1/19/2022  Last Seen by PCP: 12/3/2020    Last Written: 2/17/22 30 with 0     Last UDS: no uds    Med Agreement Signed On: no med contract    Next Appointment: 3/21/2022      Requested Prescriptions     Pending Prescriptions Disp Refills    amphetamine-dextroamphetamine (ADDERALL XR) 20 MG extended release capsule 30 capsule 0     Sig: Take 1 capsule by mouth every morning for 30 days.

## 2022-03-22 NOTE — TELEPHONE ENCOUNTER
.  Refill Request     Last Seen: Last Seen Department: 1/19/2022  Last Seen by PCP: Visit date not found    Last Written: 11/29/21 180 with 0     Next Appointment:   No future appointments.         Requested Prescriptions     Pending Prescriptions Disp Refills    doxycycline hyclate (VIBRAMYCIN) 100 MG capsule 180 capsule 0     Sig: Take 1 capsule by mouth 2 times daily rosacea

## 2022-04-18 DIAGNOSIS — F98.8 ATTENTION DEFICIT DISORDER, UNSPECIFIED HYPERACTIVITY PRESENCE: ICD-10-CM

## 2022-04-19 NOTE — TELEPHONE ENCOUNTER
Refill Request - Controlled Substance    Last Seen Department: 1/19/2022  Last Seen by PCP: 12/3/2020    Last Written: 03/22/22     Last UDS: n/a     Med Agreement Signed On: 07/16/2015     Next Appointment: Visit date not found    FD: please help patient to schedule for appointment:   Return in about 3 months (around 4/19/2022), or if symptoms worsen or fail to improve, for ADHD          Requested Prescriptions     Pending Prescriptions Disp Refills    amphetamine-dextroamphetamine (ADDERALL XR) 20 MG extended release capsule 30 capsule 0     Sig: Take 1 capsule by mouth every morning for 30 days.

## 2022-04-20 RX ORDER — DEXTROAMPHETAMINE SACCHARATE, AMPHETAMINE ASPARTATE MONOHYDRATE, DEXTROAMPHETAMINE SULFATE AND AMPHETAMINE SULFATE 5; 5; 5; 5 MG/1; MG/1; MG/1; MG/1
20 CAPSULE, EXTENDED RELEASE ORAL EVERY MORNING
Qty: 30 CAPSULE | Refills: 0 | Status: SHIPPED | OUTPATIENT
Start: 2022-04-20 | End: 2022-05-18 | Stop reason: SDUPTHER

## 2022-05-18 DIAGNOSIS — F98.8 ATTENTION DEFICIT DISORDER, UNSPECIFIED HYPERACTIVITY PRESENCE: ICD-10-CM

## 2022-05-19 RX ORDER — DEXTROAMPHETAMINE SACCHARATE, AMPHETAMINE ASPARTATE MONOHYDRATE, DEXTROAMPHETAMINE SULFATE AND AMPHETAMINE SULFATE 5; 5; 5; 5 MG/1; MG/1; MG/1; MG/1
20 CAPSULE, EXTENDED RELEASE ORAL EVERY MORNING
Qty: 30 CAPSULE | Refills: 0 | Status: SHIPPED | OUTPATIENT
Start: 2022-05-19 | End: 2022-06-20

## 2022-05-19 NOTE — TELEPHONE ENCOUNTER
Refill Request - Controlled Substance    CONFIRM preferrred pharmacy with the patient. Last Seen Department: 1/19/2022  Last Seen by PCP: 12/3/2020    Last Written: 4/20/2022    Last UDS: N/A    Med Agreement Signed On: 7/16/2015    Next Appointment: 8/31/2022    Future appointment scheduled    Requested Prescriptions     Pending Prescriptions Disp Refills    amphetamine-dextroamphetamine (ADDERALL XR) 20 MG extended release capsule 30 capsule 0     Sig: Take 1 capsule by mouth every morning for 30 days.

## 2022-06-19 DIAGNOSIS — F98.8 ATTENTION DEFICIT DISORDER, UNSPECIFIED HYPERACTIVITY PRESENCE: ICD-10-CM

## 2022-06-20 DIAGNOSIS — F98.8 ATTENTION DEFICIT DISORDER, UNSPECIFIED HYPERACTIVITY PRESENCE: Primary | ICD-10-CM

## 2022-06-20 RX ORDER — DEXTROAMPHETAMINE SACCHARATE, AMPHETAMINE ASPARTATE, DEXTROAMPHETAMINE SULFATE AND AMPHETAMINE SULFATE 7.5; 7.5; 7.5; 7.5 MG/1; MG/1; MG/1; MG/1
30 TABLET ORAL DAILY
Qty: 30 TABLET | Refills: 0 | Status: SHIPPED | OUTPATIENT
Start: 2022-06-20 | End: 2022-08-01 | Stop reason: SDUPTHER

## 2022-06-20 RX ORDER — DEXTROAMPHETAMINE SACCHARATE, AMPHETAMINE ASPARTATE MONOHYDRATE, DEXTROAMPHETAMINE SULFATE AND AMPHETAMINE SULFATE 5; 5; 5; 5 MG/1; MG/1; MG/1; MG/1
20 CAPSULE, EXTENDED RELEASE ORAL EVERY MORNING
Qty: 30 CAPSULE | Refills: 0 | OUTPATIENT
Start: 2022-06-20 | End: 2022-07-20

## 2022-06-20 NOTE — TELEPHONE ENCOUNTER
.  Refill Request - Controlled Substance    CONFIRM preferrred pharmacy with the patient. Last Seen Department: 1/19/2022  Last Seen by PCP: 12/3/2020    Last Written: 5-19-22 30 with 0     Last UDS: no uds on file     Med Agreement Signed On: no med contract    Next Appointment: 8/31/2022    Future appointment scheduled    Requested Prescriptions     Pending Prescriptions Disp Refills    amphetamine-dextroamphetamine (ADDERALL XR) 20 MG extended release capsule 30 capsule 0     Sig: Take 1 capsule by mouth every morning for 30 days.

## 2022-08-01 DIAGNOSIS — F98.8 ATTENTION DEFICIT DISORDER, UNSPECIFIED HYPERACTIVITY PRESENCE: ICD-10-CM

## 2022-08-01 RX ORDER — DOXYCYCLINE HYCLATE 100 MG/1
100 CAPSULE ORAL 2 TIMES DAILY
Qty: 180 CAPSULE | Refills: 0 | Status: SHIPPED | OUTPATIENT
Start: 2022-08-01

## 2022-08-01 RX ORDER — DEXTROAMPHETAMINE SACCHARATE, AMPHETAMINE ASPARTATE, DEXTROAMPHETAMINE SULFATE AND AMPHETAMINE SULFATE 7.5; 7.5; 7.5; 7.5 MG/1; MG/1; MG/1; MG/1
30 TABLET ORAL DAILY
Qty: 30 TABLET | Refills: 0 | Status: SHIPPED | OUTPATIENT
Start: 2022-08-01 | End: 2022-09-14 | Stop reason: SDUPTHER

## 2022-08-01 NOTE — TELEPHONE ENCOUNTER
Refill Request - Controlled Substance    CONFIRM preferrred pharmacy with the patient. Last Seen Department: 1/19/2022  Last Seen by PCP: 12/3/2020    Last Written:   Adderall- 6/20/2022 30 Tablet 0 Refills  Doxycycline hyclate-3/22/22 180 Capsule 1 Refill    Last UDS: Not On File    Med Agreement Signed On: 7/16/2015    Next Appointment: 8/31/2022    Future appointment scheduled    Requested Prescriptions     Pending Prescriptions Disp Refills    doxycycline hyclate (VIBRAMYCIN) 100 MG capsule 180 capsule 1     Sig: Take 1 capsule by mouth in the morning and 1 capsule before bedtime. rosacea . amphetamine-dextroamphetamine (ADDERALL) 30 MG tablet 30 tablet 0     Sig: Take 1 tablet by mouth in the morning for 30 days.

## 2022-08-02 DIAGNOSIS — F98.8 ATTENTION DEFICIT DISORDER, UNSPECIFIED HYPERACTIVITY PRESENCE: ICD-10-CM

## 2022-08-02 RX ORDER — DOXYCYCLINE HYCLATE 100 MG/1
100 CAPSULE ORAL 2 TIMES DAILY
Qty: 180 CAPSULE | Refills: 0 | OUTPATIENT
Start: 2022-08-02

## 2022-08-02 RX ORDER — DEXTROAMPHETAMINE SACCHARATE, AMPHETAMINE ASPARTATE, DEXTROAMPHETAMINE SULFATE AND AMPHETAMINE SULFATE 7.5; 7.5; 7.5; 7.5 MG/1; MG/1; MG/1; MG/1
30 TABLET ORAL DAILY
Qty: 30 TABLET | Refills: 0 | OUTPATIENT
Start: 2022-08-02 | End: 2022-09-01

## 2022-08-02 NOTE — TELEPHONE ENCOUNTER
.Refill Request - Controlled Substance    CONFIRM preferrred pharmacy with the patient. Last Seen Department: 1/19/2022  Last Seen by PCP: 12/3/2020    Last Written: 8/1/22 30 with 0     Last UDS: no uds     Med Agreement Signed On: 7/16/15    Next Appointment: 8/31/2022    Future appointment scheduled    Requested Prescriptions     Pending Prescriptions Disp Refills    doxycycline hyclate (VIBRAMYCIN) 100 MG capsule 180 capsule 0     Sig: Take 1 capsule by mouth in the morning and 1 capsule before bedtime. rosacea . amphetamine-dextroamphetamine (ADDERALL) 30 MG tablet 30 tablet 0     Sig: Take 1 tablet by mouth in the morning for 30 days.

## 2022-09-14 DIAGNOSIS — F98.8 ATTENTION DEFICIT DISORDER, UNSPECIFIED HYPERACTIVITY PRESENCE: ICD-10-CM

## 2022-09-14 RX ORDER — DEXTROAMPHETAMINE SACCHARATE, AMPHETAMINE ASPARTATE, DEXTROAMPHETAMINE SULFATE AND AMPHETAMINE SULFATE 7.5; 7.5; 7.5; 7.5 MG/1; MG/1; MG/1; MG/1
30 TABLET ORAL DAILY
Qty: 30 TABLET | Refills: 0 | Status: SHIPPED | OUTPATIENT
Start: 2022-09-14 | End: 2022-10-14

## 2022-09-14 NOTE — TELEPHONE ENCOUNTER
.Refill Request - Controlled Substance    CONFIRM preferrred pharmacy with the patient. If Mail Order Rx - Pend for 90 day refill. Last Seen Department: 1/19/2022  Last Seen by PCP: 12/3/2020    Last Written: 8-1-22 30 with 0     Last UDS: no uds    Med Agreement Signed On: 7-16-15    If no future appointment scheduled, route STAFF MESSAGE with patient name to the [facility] for scheduling. CONFIRM preferrred pharmacy with the patient. Next Appointment:   No future appointments. Message sent to [address] [place] [address] to schedule appt with patient? YES      Requested Prescriptions     Pending Prescriptions Disp Refills    amphetamine-dextroamphetamine (ADDERALL) 30 MG tablet 30 tablet 0     Sig: Take 1 tablet by mouth daily for 30 days.

## 2022-11-07 DIAGNOSIS — F98.8 ATTENTION DEFICIT DISORDER, UNSPECIFIED HYPERACTIVITY PRESENCE: ICD-10-CM

## 2022-11-08 RX ORDER — DEXTROAMPHETAMINE SACCHARATE, AMPHETAMINE ASPARTATE, DEXTROAMPHETAMINE SULFATE AND AMPHETAMINE SULFATE 7.5; 7.5; 7.5; 7.5 MG/1; MG/1; MG/1; MG/1
30 TABLET ORAL DAILY
Qty: 30 TABLET | Refills: 0 | Status: SHIPPED | OUTPATIENT
Start: 2022-11-08 | End: 2022-12-08

## 2022-11-08 RX ORDER — DOXYCYCLINE HYCLATE 100 MG/1
100 CAPSULE ORAL 2 TIMES DAILY
Qty: 180 CAPSULE | Refills: 0 | Status: SHIPPED | OUTPATIENT
Start: 2022-11-08

## 2022-11-08 NOTE — TELEPHONE ENCOUNTER
Refill Request - Controlled Substance    CONFIRM preferrred pharmacy with the patient. If Mail Order Rx - Pend for 90 day refill. Last Seen Department: 1/19/2022  Last Seen by PCP: 12/3/2020    Last Written:   Vibramycin- 08/1/2022 180 capsule 0 refills   Adderall-09/14/2022 30 tablet 0 refills     Last UDS: Not On File    Med Agreement Signed On: 07/16/2015    If no future appointment scheduled, route STAFF MESSAGE with patient name to the [facility] for scheduling. CONFIRM preferrred pharmacy with the patient. Next Appointment:   No future appointments. Message sent to [email or web address] to schedule appt with patient? YES  Return in about 3 months (around 4/19/2022), or if symptoms worsen or fail to improve, for ADHD. Requested Prescriptions     Pending Prescriptions Disp Refills    doxycycline hyclate (VIBRAMYCIN) 100 MG capsule 180 capsule 0     Sig: Take 1 capsule by mouth 2 times daily rosacea    amphetamine-dextroamphetamine (ADDERALL) 30 MG tablet 30 tablet 0     Sig: Take 1 tablet by mouth daily for 30 days.

## 2022-12-15 DIAGNOSIS — F98.8 ATTENTION DEFICIT DISORDER, UNSPECIFIED HYPERACTIVITY PRESENCE: ICD-10-CM

## 2022-12-15 RX ORDER — DEXTROAMPHETAMINE SACCHARATE, AMPHETAMINE ASPARTATE, DEXTROAMPHETAMINE SULFATE AND AMPHETAMINE SULFATE 7.5; 7.5; 7.5; 7.5 MG/1; MG/1; MG/1; MG/1
30 TABLET ORAL DAILY
Qty: 30 TABLET | Refills: 0 | Status: SHIPPED | OUTPATIENT
Start: 2022-12-15 | End: 2023-01-14

## 2022-12-15 NOTE — TELEPHONE ENCOUNTER
Refill Request - Controlled Substance    CONFIRM preferrred pharmacy with the patient. If Mail Order Rx - Pend for 90 day refill. Last Seen Department: 1/19/2022  Last Seen by PCP: 12/3/2020    Last Written: 11/08/2022 30 tablet 0 refills     Last UDS: Not On File    Med Agreement Signed On: 07/16/2015    If no future appointment scheduled, route STAFF MESSAGE with patient name to the [facility] for scheduling. CONFIRM preferrred pharmacy with the patient. Next Appointment:   Future Appointments   Date Time Provider Chente Kaplan   2/1/2023  8:00 AM DO JOSELINE Mlacolm [place] - [place]       Message sent to [employer] to schedule appt with patient? NO      Requested Prescriptions     Pending Prescriptions Disp Refills    amphetamine-dextroamphetamine (ADDERALL) 30 MG tablet 30 tablet 0     Sig: Take 1 tablet by mouth daily for 30 days.

## 2023-02-01 ENCOUNTER — TELEPHONE (OUTPATIENT)
Dept: FAMILY MEDICINE CLINIC | Age: 48
End: 2023-02-01

## 2023-02-01 NOTE — TELEPHONE ENCOUNTER
----- Message from Pedrito Abdi sent at 2/1/2023  8:17 AM EST -----  Subject: Appointment Request    Reason for Call: Established Patient Appointment needed: Routine Existing   Condition Follow Up    QUESTIONS    Reason for appointment request? Available appointments did not meet   patient need     Additional Information for Provider? Patient called said she wont make it   to this mornings 8:00 appt since it is already past the appt time and   wanted to r/s first opening is in April and that [employer] work for her she   needs a sooner appt. It is for a med check.  Please return call.  ---------------------------------------------------------------------------  --------------  [facility]oren[facility] Almaguer [facility]  [phone]; OK to leave message on voicemail  ---------------------------------------------------------------------------  --------------  SCRIPT ANSWERS  COVID Screen: Ting Be

## 2023-02-06 DIAGNOSIS — F98.8 ATTENTION DEFICIT DISORDER, UNSPECIFIED HYPERACTIVITY PRESENCE: ICD-10-CM

## 2023-02-06 RX ORDER — DOXYCYCLINE HYCLATE 100 MG/1
100 CAPSULE ORAL 2 TIMES DAILY
Qty: 180 CAPSULE | Refills: 0 | Status: SHIPPED | OUTPATIENT
Start: 2023-02-06

## 2023-02-06 RX ORDER — DEXTROAMPHETAMINE SACCHARATE, AMPHETAMINE ASPARTATE, DEXTROAMPHETAMINE SULFATE AND AMPHETAMINE SULFATE 7.5; 7.5; 7.5; 7.5 MG/1; MG/1; MG/1; MG/1
30 TABLET ORAL DAILY
Qty: 30 TABLET | Refills: 0 | Status: SHIPPED | OUTPATIENT
Start: 2023-02-06 | End: 2023-03-08

## 2023-02-06 NOTE — TELEPHONE ENCOUNTER
Refill Request - Controlled Substance    CONFIRM preferrred pharmacy with the patient. If Mail Order Rx - Pend for 90 day refill. Last Seen Department: 1/19/2022    Last Seen by PCP: 12/3/2020    Last Written: Vibramycin: 11/8/22 180 capsule 0 refills                       Adderall: 12/15/22 30 tablet 0 refills      Last UDS: n/a    Med Agreement Signed On: 7/16/15     If no future appointment scheduled, route STAFF MESSAGE with patient name to the [facility] for scheduling. CONFIRM preferrred pharmacy with the patient. Next Appointment: 4/24/23  Future Appointments   Date Time Provider Chente Kaplan   4/24/2023  2:00 PM DO JOSELINE Malcolm [place] - [place]       Message sent to [email or web address] to schedule appt with patient? NO      Requested Prescriptions     Pending Prescriptions Disp Refills    doxycycline hyclate (VIBRAMYCIN) 100 MG capsule 180 capsule 0     Sig: Take 1 capsule by mouth 2 times daily rosacea    amphetamine-dextroamphetamine (ADDERALL) 30 MG tablet 30 tablet 0     Sig: Take 1 tablet by mouth daily for 30 days.

## 2023-02-08 ENCOUNTER — TELEPHONE (OUTPATIENT)
Dept: FAMILY MEDICINE CLINIC | Age: 48
End: 2023-02-08

## 2023-02-08 DIAGNOSIS — Z12.11 COLON CANCER SCREENING: Primary | ICD-10-CM

## 2023-02-08 NOTE — TELEPHONE ENCOUNTER
Contacted the PT she stated she did not complete her Cologuard sample.  I advised the order has  and a new order will be place, PT stated she will complete test.

## 2023-03-17 DIAGNOSIS — F98.8 ATTENTION DEFICIT DISORDER, UNSPECIFIED HYPERACTIVITY PRESENCE: ICD-10-CM

## 2023-03-17 RX ORDER — DEXTROAMPHETAMINE SACCHARATE, AMPHETAMINE ASPARTATE, DEXTROAMPHETAMINE SULFATE AND AMPHETAMINE SULFATE 7.5; 7.5; 7.5; 7.5 MG/1; MG/1; MG/1; MG/1
30 TABLET ORAL DAILY
Qty: 30 TABLET | Refills: 0 | Status: SHIPPED | OUTPATIENT
Start: 2023-03-17 | End: 2023-04-16

## 2023-03-17 NOTE — TELEPHONE ENCOUNTER
Refill Request - Controlled Substance    CONFIRM preferred pharmacy with the patient. If Mail Order Rx - Pend for 90 day refill. Last Seen Department: 1/19/2022  Last Seen by PCP: 12/3/2020    Last Written: 2/6/2023    Last UDS: 7/16/2015    Med Agreement Signed On: n/a    If no future appointment scheduled, route STAFF MESSAGE with patient name to the [facility] for scheduling. CONFIRM preferrred pharmacy with the patient. Next Appointment:   Future Appointments   Date Time Provider Chente Kaplan   4/24/2023  2:00 PM DO JOSELINE Malcolm [place] - [place]       Message sent to [employer] to schedule appt with patient? YES      Requested Prescriptions     Pending Prescriptions Disp Refills    amphetamine-dextroamphetamine (ADDERALL) 30 MG tablet 30 tablet 0     Sig: Take 1 tablet by mouth daily for 30 days.

## 2023-03-21 DIAGNOSIS — F98.8 ATTENTION DEFICIT DISORDER, UNSPECIFIED HYPERACTIVITY PRESENCE: ICD-10-CM

## 2023-03-21 RX ORDER — DEXTROAMPHETAMINE SACCHARATE, AMPHETAMINE ASPARTATE, DEXTROAMPHETAMINE SULFATE AND AMPHETAMINE SULFATE 7.5; 7.5; 7.5; 7.5 MG/1; MG/1; MG/1; MG/1
30 TABLET ORAL DAILY
Qty: 30 TABLET | Refills: 0 | Status: CANCELLED | OUTPATIENT
Start: 2023-03-21 | End: 2023-04-20

## 2023-03-22 DIAGNOSIS — F98.8 ATTENTION DEFICIT DISORDER, UNSPECIFIED HYPERACTIVITY PRESENCE: ICD-10-CM

## 2023-03-22 RX ORDER — DEXTROAMPHETAMINE SACCHARATE, AMPHETAMINE ASPARTATE, DEXTROAMPHETAMINE SULFATE AND AMPHETAMINE SULFATE 7.5; 7.5; 7.5; 7.5 MG/1; MG/1; MG/1; MG/1
30 TABLET ORAL DAILY
Qty: 30 TABLET | Refills: 0 | OUTPATIENT
Start: 2023-03-22 | End: 2023-04-21

## 2023-03-27 DIAGNOSIS — F98.8 ATTENTION DEFICIT DISORDER, UNSPECIFIED HYPERACTIVITY PRESENCE: ICD-10-CM

## 2023-03-27 RX ORDER — DEXTROAMPHETAMINE SACCHARATE, AMPHETAMINE ASPARTATE, DEXTROAMPHETAMINE SULFATE AND AMPHETAMINE SULFATE 7.5; 7.5; 7.5; 7.5 MG/1; MG/1; MG/1; MG/1
30 TABLET ORAL DAILY
Qty: 30 TABLET | Refills: 0 | Status: CANCELLED | OUTPATIENT
Start: 2023-03-27 | End: 2023-04-26

## 2023-04-24 ENCOUNTER — OFFICE VISIT (OUTPATIENT)
Dept: FAMILY MEDICINE CLINIC | Age: 48
End: 2023-04-24
Payer: COMMERCIAL

## 2023-04-24 VITALS
HEART RATE: 97 BPM | OXYGEN SATURATION: 98 % | SYSTOLIC BLOOD PRESSURE: 118 MMHG | WEIGHT: 148.8 LBS | DIASTOLIC BLOOD PRESSURE: 82 MMHG | HEIGHT: 66 IN | BODY MASS INDEX: 23.91 KG/M2

## 2023-04-24 DIAGNOSIS — Z13.220 LIPID SCREENING: ICD-10-CM

## 2023-04-24 DIAGNOSIS — F98.8 ATTENTION DEFICIT DISORDER (ADD) WITHOUT HYPERACTIVITY: Primary | ICD-10-CM

## 2023-04-24 DIAGNOSIS — Z80.41 FAMILY HISTORY OF OVARIAN CANCER: ICD-10-CM

## 2023-04-24 DIAGNOSIS — Z11.59 ENCOUNTER FOR HEPATITIS C SCREENING TEST FOR LOW RISK PATIENT: ICD-10-CM

## 2023-04-24 DIAGNOSIS — E55.9 VITAMIN D DEFICIENCY: ICD-10-CM

## 2023-04-24 PROCEDURE — 99214 OFFICE O/P EST MOD 30 MIN: CPT | Performed by: FAMILY MEDICINE

## 2023-04-24 ASSESSMENT — PATIENT HEALTH QUESTIONNAIRE - PHQ9
8. MOVING OR SPEAKING SO SLOWLY THAT OTHER PEOPLE COULD HAVE NOTICED. OR THE OPPOSITE, BEING SO FIGETY OR RESTLESS THAT YOU HAVE BEEN MOVING AROUND A LOT MORE THAN USUAL: 0
1. LITTLE INTEREST OR PLEASURE IN DOING THINGS: 1
6. FEELING BAD ABOUT YOURSELF - OR THAT YOU ARE A FAILURE OR HAVE LET YOURSELF OR YOUR FAMILY DOWN: 0
SUM OF ALL RESPONSES TO PHQ QUESTIONS 1-9: 5
4. FEELING TIRED OR HAVING LITTLE ENERGY: 1
SUM OF ALL RESPONSES TO PHQ9 QUESTIONS 1 & 2: 2
DEPRESSION UNABLE TO ASSESS: FUNCTIONAL CAPACITY MOTIVATION LIMITS ACCURACY
7. TROUBLE CONCENTRATING ON THINGS, SUCH AS READING THE NEWSPAPER OR WATCHING TELEVISION: 0
SUM OF ALL RESPONSES TO PHQ QUESTIONS 1-9: 5
2. FEELING DOWN, DEPRESSED OR HOPELESS: 1
10. IF YOU CHECKED OFF ANY PROBLEMS, HOW DIFFICULT HAVE THESE PROBLEMS MADE IT FOR YOU TO DO YOUR WORK, TAKE CARE OF THINGS AT HOME, OR GET ALONG WITH OTHER PEOPLE: 1
5. POOR APPETITE OR OVEREATING: 1
9. THOUGHTS THAT YOU WOULD BE BETTER OFF DEAD, OR OF HURTING YOURSELF: 0
3. TROUBLE FALLING OR STAYING ASLEEP: 1

## 2023-04-24 ASSESSMENT — ENCOUNTER SYMPTOMS
ABDOMINAL DISTENTION: 0
TROUBLE SWALLOWING: 0
EYE REDNESS: 0
CHEST TIGHTNESS: 0
NAUSEA: 0
CONSTIPATION: 0
DIARRHEA: 0
EYE ITCHING: 0
WHEEZING: 0
BLOOD IN STOOL: 0
VOMITING: 0
VOICE CHANGE: 0
EYE PAIN: 0
ABDOMINAL PAIN: 0
SHORTNESS OF BREATH: 0
BACK PAIN: 0
EYE DISCHARGE: 0
SINUS PRESSURE: 0
ANAL BLEEDING: 0
COUGH: 0
SORE THROAT: 0
RHINORRHEA: 0

## 2023-04-24 ASSESSMENT — ANXIETY QUESTIONNAIRES
3. WORRYING TOO MUCH ABOUT DIFFERENT THINGS: 1
6. BECOMING EASILY ANNOYED OR IRRITABLE: 1
IF YOU CHECKED OFF ANY PROBLEMS ON THIS QUESTIONNAIRE, HOW DIFFICULT HAVE THESE PROBLEMS MADE IT FOR YOU TO DO YOUR WORK, TAKE CARE OF THINGS AT HOME, OR GET ALONG WITH OTHER PEOPLE: SOMEWHAT DIFFICULT
2. NOT BEING ABLE TO STOP OR CONTROL WORRYING: 0
1. FEELING NERVOUS, ANXIOUS, OR ON EDGE: 1
GAD7 TOTAL SCORE: 3
7. FEELING AFRAID AS IF SOMETHING AWFUL MIGHT HAPPEN: 0
4. TROUBLE RELAXING: 0
5. BEING SO RESTLESS THAT IT IS HARD TO SIT STILL: 0

## 2023-04-24 NOTE — PROGRESS NOTES
Subjective:      Patient ID: Faizan Jones is a 52 y.o. female. HPI  Patient in for checkup for several medical issues. ADD-on medication and doing very well with no effect on blood pressure or heart rate. She states she has a grandmother who had ovarian cancer and she would like to have a lab check for  and will also be seeing her gynecologist for an exam.  Vitamin D deficiency-she states she has been on vitamin D in the past but not at the present time. She is due for some lab work which she will do in the next day or 2. Colonoscopy-she has an order for Cologuard and will get to that very soon. Review of Systems    Review of Systems   Constitutional:  Negative for fatigue and unexpected weight change. HENT:  Negative for congestion, ear pain, hearing loss, nosebleeds, postnasal drip, rhinorrhea, sinus pressure, sneezing, sore throat, tinnitus, trouble swallowing and voice change. Eyes:  Negative for pain, discharge, redness, itching and visual disturbance. Respiratory:  Negative for cough, chest tightness, shortness of breath and wheezing. Cardiovascular:  Negative for chest pain, palpitations and leg swelling. Gastrointestinal:  Negative for abdominal distention, abdominal pain, anal bleeding, blood in stool, constipation, diarrhea, nausea and vomiting. Genitourinary:  Positive for frequency. Negative for dysuria, flank pain, hematuria, menstrual problem, pelvic pain, urgency and vaginal discharge. See HPI   Musculoskeletal:  Positive for arthralgias. Negative for back pain, gait problem, joint swelling, myalgias and neck pain. Skin:  Negative for pallor and rash. Neurological:  Negative for dizziness, tremors, seizures, weakness, light-headedness, numbness and headaches. Hematological:  Negative for adenopathy. Does not bruise/bleed easily. Psychiatric/Behavioral:  Positive for decreased concentration. Negative for agitation, confusion and sleep disturbance.  The

## 2023-04-25 LAB
REASON FOR REJECTION: NORMAL
REJECTED TEST: 9288

## 2023-04-28 DIAGNOSIS — Z80.41 FAMILY HISTORY OF OVARIAN CANCER: ICD-10-CM

## 2023-04-28 DIAGNOSIS — F98.8 ATTENTION DEFICIT DISORDER (ADD) WITHOUT HYPERACTIVITY: ICD-10-CM

## 2023-04-28 DIAGNOSIS — Z13.220 LIPID SCREENING: ICD-10-CM

## 2023-04-28 DIAGNOSIS — E55.9 VITAMIN D DEFICIENCY: ICD-10-CM

## 2023-04-28 DIAGNOSIS — Z11.59 ENCOUNTER FOR HEPATITIS C SCREENING TEST FOR LOW RISK PATIENT: ICD-10-CM

## 2023-04-28 LAB
ALBUMIN SERPL-MCNC: 4.4 G/DL (ref 3.4–5)
ALBUMIN/GLOB SERPL: 2 {RATIO} (ref 1.1–2.2)
ALP SERPL-CCNC: 78 U/L (ref 40–129)
ALT SERPL-CCNC: 13 U/L (ref 10–40)
ANION GAP SERPL CALCULATED.3IONS-SCNC: 9 MMOL/L (ref 3–16)
AST SERPL-CCNC: 14 U/L (ref 15–37)
BASOPHILS # BLD: 0 K/UL (ref 0–0.2)
BASOPHILS NFR BLD: 0.5 %
BILIRUB SERPL-MCNC: 0.4 MG/DL (ref 0–1)
BUN SERPL-MCNC: 13 MG/DL (ref 7–20)
CALCIUM SERPL-MCNC: 9.2 MG/DL (ref 8.3–10.6)
CANCER AG125 SERPL-ACNC: 36 U/ML (ref 0–35)
CHLORIDE SERPL-SCNC: 103 MMOL/L (ref 99–110)
CHOLEST SERPL-MCNC: 194 MG/DL (ref 0–199)
CO2 SERPL-SCNC: 27 MMOL/L (ref 21–32)
CREAT SERPL-MCNC: 0.6 MG/DL (ref 0.6–1.1)
DEPRECATED RDW RBC AUTO: 13.4 % (ref 12.4–15.4)
EOSINOPHIL # BLD: 0.1 K/UL (ref 0–0.6)
EOSINOPHIL NFR BLD: 1.3 %
GFR SERPLBLD CREATININE-BSD FMLA CKD-EPI: >60 ML/MIN/{1.73_M2}
GLUCOSE SERPL-MCNC: 94 MG/DL (ref 70–99)
HCT VFR BLD AUTO: 39.9 % (ref 36–48)
HCV AB SERPL QL IA: NORMAL
HDLC SERPL-MCNC: 56 MG/DL (ref 40–60)
HGB BLD-MCNC: 13.3 G/DL (ref 12–16)
LDLC SERPL CALC-MCNC: 120 MG/DL
LYMPHOCYTES # BLD: 2 K/UL (ref 1–5.1)
LYMPHOCYTES NFR BLD: 33.6 %
MCH RBC QN AUTO: 30.1 PG (ref 26–34)
MCHC RBC AUTO-ENTMCNC: 33.5 G/DL (ref 31–36)
MCV RBC AUTO: 90 FL (ref 80–100)
MONOCYTES # BLD: 0.4 K/UL (ref 0–1.3)
MONOCYTES NFR BLD: 7.2 %
NEUTROPHILS # BLD: 3.4 K/UL (ref 1.7–7.7)
NEUTROPHILS NFR BLD: 57.4 %
PLATELET # BLD AUTO: 210 K/UL (ref 135–450)
PMV BLD AUTO: 9.3 FL (ref 5–10.5)
POTASSIUM SERPL-SCNC: 4.6 MMOL/L (ref 3.5–5.1)
PROT SERPL-MCNC: 6.6 G/DL (ref 6.4–8.2)
RBC # BLD AUTO: 4.43 M/UL (ref 4–5.2)
SODIUM SERPL-SCNC: 139 MMOL/L (ref 136–145)
TRIGL SERPL-MCNC: 90 MG/DL (ref 0–150)
VLDLC SERPL CALC-MCNC: 18 MG/DL
WBC # BLD AUTO: 5.9 K/UL (ref 4–11)

## 2023-04-28 PROCEDURE — 36415 COLL VENOUS BLD VENIPUNCTURE: CPT | Performed by: FAMILY MEDICINE

## 2023-05-02 DIAGNOSIS — F98.8 ATTENTION DEFICIT DISORDER, UNSPECIFIED HYPERACTIVITY PRESENCE: ICD-10-CM

## 2023-05-02 RX ORDER — DOXYCYCLINE HYCLATE 100 MG/1
100 CAPSULE ORAL 2 TIMES DAILY
Qty: 180 CAPSULE | Refills: 1 | Status: SHIPPED | OUTPATIENT
Start: 2023-05-02

## 2023-05-02 RX ORDER — DEXTROAMPHETAMINE SACCHARATE, AMPHETAMINE ASPARTATE, DEXTROAMPHETAMINE SULFATE AND AMPHETAMINE SULFATE 7.5; 7.5; 7.5; 7.5 MG/1; MG/1; MG/1; MG/1
30 TABLET ORAL DAILY
Qty: 30 TABLET | Refills: 0 | Status: SHIPPED | OUTPATIENT
Start: 2023-05-02 | End: 2023-06-05 | Stop reason: SDUPTHER

## 2023-06-05 DIAGNOSIS — F98.8 ATTENTION DEFICIT DISORDER, UNSPECIFIED HYPERACTIVITY PRESENCE: ICD-10-CM

## 2023-06-05 RX ORDER — DEXTROAMPHETAMINE SACCHARATE, AMPHETAMINE ASPARTATE, DEXTROAMPHETAMINE SULFATE AND AMPHETAMINE SULFATE 7.5; 7.5; 7.5; 7.5 MG/1; MG/1; MG/1; MG/1
30 TABLET ORAL DAILY
Qty: 30 TABLET | Refills: 0 | Status: SHIPPED | OUTPATIENT
Start: 2023-06-05 | End: 2023-07-05

## 2023-06-05 NOTE — TELEPHONE ENCOUNTER
Refill Request - Controlled Substance    CONFIRM preferred pharmacy with the patient. If Mail Order Rx - Pend for 90 day refill. Last Seen Department: 4/24/2023  Last Seen by PCP: 4/24/2023    Last Written: 05/02/2023 30 tablet 0 refills     Last UDS: Not On File    Med Agreement Signed On: 07/16/2015    If no future appointment scheduled:  Review the last OV with PCP and review information for follow-up visit,  Route STAFF MESSAGE with patient name to the [facility] for scheduling with the following information:            -  Timing of next visit           -  Visit type ie Physical, OV, etc           -  Diagnoses/Reason ie. COPD, HTN - Do not use MEDICATION, Follow-up or CHECK UP - Give reason for visit        Next Appointment:   Future Appointments   Date Time Provider Chente Kaplan   10/25/2023  8:30 AM DO JOSELINE Malcolm [place] - [place]       Message sent to [address] [place] [address] to schedule appt with patient? NO      Requested Prescriptions     Pending Prescriptions Disp Refills    amphetamine-dextroamphetamine (ADDERALL) 30 MG tablet 30 tablet 0     Sig: Take 1 tablet by mouth daily for 30 days.

## 2023-07-10 DIAGNOSIS — F98.8 ATTENTION DEFICIT DISORDER, UNSPECIFIED HYPERACTIVITY PRESENCE: ICD-10-CM

## 2023-07-10 RX ORDER — DOXYCYCLINE HYCLATE 100 MG/1
100 CAPSULE ORAL 2 TIMES DAILY
Qty: 180 CAPSULE | Refills: 1 | Status: SHIPPED | OUTPATIENT
Start: 2023-07-10

## 2023-07-10 RX ORDER — DEXTROAMPHETAMINE SACCHARATE, AMPHETAMINE ASPARTATE, DEXTROAMPHETAMINE SULFATE AND AMPHETAMINE SULFATE 7.5; 7.5; 7.5; 7.5 MG/1; MG/1; MG/1; MG/1
30 TABLET ORAL DAILY
Qty: 30 TABLET | Refills: 0 | Status: SHIPPED | OUTPATIENT
Start: 2023-07-10 | End: 2023-08-09

## 2023-07-10 NOTE — TELEPHONE ENCOUNTER
Refill Request - Controlled Substance    CONFIRM preferred pharmacy with the patient. If Mail Order Rx - Pend for 90 day refill. Last Seen Department: 4/24/2023  Last Seen by PCP: 4/24/2023    Last Written: 5/2/2023 Doxycyline  6/5/2023 ADDERALL     Last UDS: NA    Med Agreement Signed On: NA    If no future appointment scheduled:  Review the last OV with PCP and review information for follow-up visit,  Route STAFF MESSAGE with patient name to the [facility] for scheduling with the following information:            -  Timing of next visit           -  Visit type ie Physical, OV, etc           -  Diagnoses/Reason ie. COPD, HTN - Do not use MEDICATION, Follow-up or CHECK UP - Give reason for visit        Next Appointment:   Future Appointments   Date Time Provider [facility] [address]   10/25/2023  8:30 AM DO JOSELINE Malcolm [place] - [place]       Message sent to [employer] to schedule appt with patient? NO      Requested Prescriptions     Pending Prescriptions Disp Refills    doxycycline hyclate (VIBRAMYCIN) 100 MG capsule 180 capsule 1     Sig: Take 1 capsule by mouth 2 times daily rosacea    amphetamine-dextroamphetamine (ADDERALL) 30 MG tablet 30 tablet 0     Sig: Take 1 tablet by mouth daily for 30 days.

## 2023-08-10 DIAGNOSIS — F98.8 ATTENTION DEFICIT DISORDER, UNSPECIFIED HYPERACTIVITY PRESENCE: ICD-10-CM

## 2023-08-11 RX ORDER — DEXTROAMPHETAMINE SACCHARATE, AMPHETAMINE ASPARTATE, DEXTROAMPHETAMINE SULFATE AND AMPHETAMINE SULFATE 7.5; 7.5; 7.5; 7.5 MG/1; MG/1; MG/1; MG/1
30 TABLET ORAL DAILY
Qty: 30 TABLET | Refills: 0 | Status: SHIPPED | OUTPATIENT
Start: 2023-08-11 | End: 2023-09-10

## 2023-08-11 NOTE — TELEPHONE ENCOUNTER
Refill Request - Controlled Substance    CONFIRM preferred pharmacy with the patient. If Mail Order Rx - Pend for 90 day refill. Last Seen Department: 4/24/2023  Last Seen by PCP: 4/24/2023    Last Written: 07/10/2023 30 tablet 0 refills     Last UDS: Not On File    Med Agreement Signed On: 07/16/2015    If no future appointment scheduled:  Review the last OV with PCP and review information for follow-up visit,  Route STAFF MESSAGE with patient name to the [facility] for scheduling with the following information:            -  Timing of next visit           -  Visit type ie Physical, OV, etc           -  Diagnoses/Reason ie. COPD, HTN - Do not use MEDICATION, Follow-up or CHECK UP - Give reason for visit        Next Appointment:   Future Appointments   Date Time Provider [facility] [address]   10/25/2023  8:30 AM DO JOSELINE Malcolm [place] - [place]       Message sent to [facility] to schedule appt with patient? NO      Requested Prescriptions     Pending Prescriptions Disp Refills    amphetamine-dextroamphetamine (ADDERALL) 30 MG tablet 30 tablet 0     Sig: Take 1 tablet by mouth daily for 30 days.

## 2023-09-18 DIAGNOSIS — F98.8 ATTENTION DEFICIT DISORDER, UNSPECIFIED HYPERACTIVITY PRESENCE: ICD-10-CM

## 2023-09-18 RX ORDER — DEXTROAMPHETAMINE SACCHARATE, AMPHETAMINE ASPARTATE, DEXTROAMPHETAMINE SULFATE AND AMPHETAMINE SULFATE 7.5; 7.5; 7.5; 7.5 MG/1; MG/1; MG/1; MG/1
30 TABLET ORAL DAILY
Qty: 30 TABLET | Refills: 0 | Status: SHIPPED | OUTPATIENT
Start: 2023-09-18 | End: 2023-10-18

## 2023-09-18 NOTE — TELEPHONE ENCOUNTER
Refill Request - Controlled Substance    CONFIRM preferred pharmacy with the patient. If Mail Order Rx - Pend for 90 day refill. Last Seen Department: 4/24/2023  Last Seen by PCP: 4/24/2023    Last Written: 9/10/23    Last UDS: n/a     Med Agreement Signed On: n/a    If no future appointment scheduled:  Review the last OV with PCP and review information for follow-up visit,  Route STAFF MESSAGE with patient name to the [facility] for scheduling with the following information:            -  Timing of next visit           -  Visit type ie Physical, OV, etc           -  Diagnoses/Reason ie. COPD, HTN - Do not use MEDICATION, Follow-up or CHECK UP - Give reason for visit        Next Appointment:   Future Appointments   Date Time Provider [address]   10/25/2023  8:30 AM DO JOSELINE Malcolm - [place]       Message sent to [facility] to schedule appt with patient? NO      Requested Prescriptions     Pending Prescriptions Disp Refills    amphetamine-dextroamphetamine (ADDERALL) 30 MG tablet 30 tablet 0     Sig: Take 1 tablet by mouth daily for 30 days.

## 2023-10-18 DIAGNOSIS — F98.8 ATTENTION DEFICIT DISORDER, UNSPECIFIED HYPERACTIVITY PRESENCE: ICD-10-CM

## 2023-10-19 RX ORDER — DEXTROAMPHETAMINE SACCHARATE, AMPHETAMINE ASPARTATE, DEXTROAMPHETAMINE SULFATE AND AMPHETAMINE SULFATE 7.5; 7.5; 7.5; 7.5 MG/1; MG/1; MG/1; MG/1
30 TABLET ORAL DAILY
Qty: 30 TABLET | Refills: 0 | Status: SHIPPED | OUTPATIENT
Start: 2023-10-19 | End: 2023-11-18

## 2023-10-24 SDOH — ECONOMIC STABILITY: INCOME INSECURITY: HOW HARD IS IT FOR YOU TO PAY FOR THE VERY BASICS LIKE FOOD, HOUSING, MEDICAL CARE, AND HEATING?: NOT HARD AT ALL

## 2023-10-24 SDOH — ECONOMIC STABILITY: TRANSPORTATION INSECURITY
IN THE PAST 12 MONTHS, HAS LACK OF TRANSPORTATION KEPT YOU FROM MEETINGS, WORK, OR FROM GETTING THINGS NEEDED FOR DAILY LIVING?: NO

## 2023-10-24 SDOH — ECONOMIC STABILITY: FOOD INSECURITY: WITHIN THE PAST 12 MONTHS, YOU WORRIED THAT YOUR FOOD WOULD RUN OUT BEFORE YOU GOT MONEY TO BUY MORE.: NEVER TRUE

## 2023-10-24 SDOH — ECONOMIC STABILITY: FOOD INSECURITY: WITHIN THE PAST 12 MONTHS, THE FOOD YOU BOUGHT JUST DIDN'T LAST AND YOU DIDN'T HAVE MONEY TO GET MORE.: NEVER TRUE

## 2023-10-24 SDOH — ECONOMIC STABILITY: HOUSING INSECURITY
IN THE LAST 12 MONTHS, WAS THERE A TIME WHEN YOU DID NOT HAVE A STEADY PLACE TO SLEEP OR SLEPT IN A SHELTER (INCLUDING NOW)?: NO

## 2023-10-25 ENCOUNTER — OFFICE VISIT (OUTPATIENT)
Dept: FAMILY MEDICINE CLINIC | Age: 48
End: 2023-10-25
Payer: COMMERCIAL

## 2023-10-25 VITALS
BODY MASS INDEX: 23.92 KG/M2 | SYSTOLIC BLOOD PRESSURE: 110 MMHG | OXYGEN SATURATION: 97 % | HEART RATE: 77 BPM | DIASTOLIC BLOOD PRESSURE: 76 MMHG | WEIGHT: 148.2 LBS

## 2023-10-25 DIAGNOSIS — Z12.11 COLON CANCER SCREENING: ICD-10-CM

## 2023-10-25 DIAGNOSIS — G56.03 CARPAL TUNNEL SYNDROME ON BOTH SIDES: ICD-10-CM

## 2023-10-25 DIAGNOSIS — L71.9 ROSACEA: ICD-10-CM

## 2023-10-25 DIAGNOSIS — F98.8 ATTENTION DEFICIT DISORDER (ADD) WITHOUT HYPERACTIVITY: Primary | ICD-10-CM

## 2023-10-25 PROCEDURE — 99214 OFFICE O/P EST MOD 30 MIN: CPT | Performed by: FAMILY MEDICINE

## 2023-10-25 ASSESSMENT — ENCOUNTER SYMPTOMS
CONSTIPATION: 0
SHORTNESS OF BREATH: 0
EYE ITCHING: 0
EYE REDNESS: 0
TROUBLE SWALLOWING: 0
WHEEZING: 0
RHINORRHEA: 0
CHEST TIGHTNESS: 0
ANAL BLEEDING: 0
DIARRHEA: 0
ABDOMINAL PAIN: 0
COUGH: 0
EYE DISCHARGE: 0
ABDOMINAL DISTENTION: 0
BACK PAIN: 0
BLOOD IN STOOL: 0
EYE PAIN: 0
SORE THROAT: 0
VOMITING: 0
SINUS PRESSURE: 0
NAUSEA: 0
VOICE CHANGE: 0

## 2023-10-25 NOTE — PROGRESS NOTES
Subjective:      Patient ID: Genia Nuñez is a 50 y.o. female. HPI  Patient in for 6-month checkup on several medical issues. ADD-on medication and doing very well-probably not checking her blood pressure or heart rate as often as she should but she will. Check-on medication and again keeping her skin clear. Colonoscopy-no family history and I will do a referral.  She states that she has been having intermittent problems with probable carpal tunnel syndrome due to the fact that she is doing a lot of peace work and repetition at work and it does wake her up at night occasionally-she states her strength seems to be okay. Had lab work last visit and we will probably repeat at the next visit. Review of Systems    Review of Systems   Constitutional:  Negative for fatigue and unexpected weight change. HENT:  Negative for congestion, ear pain, hearing loss, nosebleeds, postnasal drip, rhinorrhea, sinus pressure, sneezing, sore throat, tinnitus, trouble swallowing and voice change. Eyes:  Negative for pain, discharge, redness, itching and visual disturbance. Respiratory:  Negative for cough, chest tightness, shortness of breath and wheezing. Cardiovascular:  Negative for chest pain, palpitations and leg swelling. Gastrointestinal:  Negative for abdominal distention, abdominal pain, anal bleeding, blood in stool, constipation, diarrhea, nausea and vomiting. Genitourinary:  Positive for frequency. Negative for dysuria, flank pain, hematuria, menstrual problem, pelvic pain, urgency and vaginal discharge. Musculoskeletal:  Positive for arthralgias. Negative for back pain, gait problem, joint swelling, myalgias and neck pain. Skin:  Negative for pallor and rash. Neurological:  Positive for numbness. Negative for dizziness, tremors, seizures, weakness, light-headedness and headaches. See HPI   Hematological:  Negative for adenopathy. Does not bruise/bleed easily.

## 2023-12-02 DIAGNOSIS — F98.8 ATTENTION DEFICIT DISORDER, UNSPECIFIED HYPERACTIVITY PRESENCE: ICD-10-CM

## 2023-12-03 RX ORDER — DEXTROAMPHETAMINE SACCHARATE, AMPHETAMINE ASPARTATE, DEXTROAMPHETAMINE SULFATE AND AMPHETAMINE SULFATE 7.5; 7.5; 7.5; 7.5 MG/1; MG/1; MG/1; MG/1
30 TABLET ORAL DAILY
Qty: 30 TABLET | Refills: 0 | Status: SHIPPED | OUTPATIENT
Start: 2023-12-03 | End: 2024-01-02

## 2023-12-03 RX ORDER — DOXYCYCLINE HYCLATE 100 MG/1
100 CAPSULE ORAL 2 TIMES DAILY
Qty: 180 CAPSULE | Refills: 1 | Status: SHIPPED | OUTPATIENT
Start: 2023-12-03

## 2024-01-16 DIAGNOSIS — F98.8 ATTENTION DEFICIT DISORDER, UNSPECIFIED HYPERACTIVITY PRESENCE: ICD-10-CM

## 2024-01-16 RX ORDER — DEXTROAMPHETAMINE SACCHARATE, AMPHETAMINE ASPARTATE, DEXTROAMPHETAMINE SULFATE AND AMPHETAMINE SULFATE 7.5; 7.5; 7.5; 7.5 MG/1; MG/1; MG/1; MG/1
30 TABLET ORAL DAILY
Qty: 30 TABLET | Refills: 0 | Status: SHIPPED | OUTPATIENT
Start: 2024-01-16 | End: 2024-02-15

## 2024-01-16 NOTE — TELEPHONE ENCOUNTER
Refill Request - Controlled Substance    CONFIRM preferred pharmacy with the patient.    If Mail Order Rx - Pend for 90 day refill.        Last Seen Department: 10/25/2023  Last Seen by PCP: 10/25/2023    Last Written: 12/3/2023    Last UDS: NA    Med Agreement Signed On: 7/16/2015    If no future appointment scheduled:  Review the last OV with PCP and review information for follow-up visit,  Route STAFF MESSAGE with patient name to the  [facility] for scheduling with the following information:            -  Timing of next visit           -  Visit type ie Physical, OV, etc           -  Diagnoses/Reason ie. COPD, HTN - Do not use MEDICATION, Follow-up or CHECK UP - Give reason for visit        Next Appointment:   Future Appointments   Date Time Provider Department Center   4/29/2024  9:30 AM Varinder Castillo DO [facility] [place] - [facility]       Message sent to  to schedule appt with patient?  NO      Requested Prescriptions     Pending Prescriptions Disp Refills    amphetamine-dextroamphetamine (ADDERALL) 30 MG tablet 30 tablet 0     Sig: Take 1 tablet by mouth daily for 30 days.

## 2024-02-17 ENCOUNTER — PATIENT MESSAGE (OUTPATIENT)
Dept: FAMILY MEDICINE CLINIC | Age: 49
End: 2024-02-17

## 2024-02-19 DIAGNOSIS — F98.8 ATTENTION DEFICIT DISORDER, UNSPECIFIED HYPERACTIVITY PRESENCE: ICD-10-CM

## 2024-02-19 RX ORDER — DEXTROAMPHETAMINE SACCHARATE, AMPHETAMINE ASPARTATE, DEXTROAMPHETAMINE SULFATE AND AMPHETAMINE SULFATE 7.5; 7.5; 7.5; 7.5 MG/1; MG/1; MG/1; MG/1
30 TABLET ORAL DAILY
Qty: 30 TABLET | Refills: 0 | Status: SHIPPED | OUTPATIENT
Start: 2024-02-19 | End: 2024-03-20

## 2024-02-19 NOTE — TELEPHONE ENCOUNTER
Refill Request - Controlled Substance    CONFIRM preferred pharmacy with the patient.    If Mail Order Rx - Pend for 90 day refill.        Last Seen Department: 10/25/2023  Last Seen by PCP: 10/25/2023    Last Written: 1/16/2024 #30, no refills    Last UDS: none    Med Agreement Signed On: 7/16/2015    If no future appointment scheduled:  Review the last OV with PCP and review information for follow-up visit,  Route STAFF MESSAGE with patient name to the  Pool for scheduling with the following information:            -  Timing of next visit           -  Visit type ie Physical, OV, etc           -  Diagnoses/Reason ie. COPD, HTN - Do not use MEDICATION, Follow-up or CHECK UP - Give reason for visit        Next Appointment:   Future Appointments   Date Time Provider Department Center   4/29/2024  9:30 AM Varinder Castillo, DO JOSELINE [facility] [place] - [place]       Message sent to  to schedule appt with patient?  NO      Requested Prescriptions     Pending Prescriptions Disp Refills    amphetamine-dextroamphetamine (ADDERALL) 30 MG tablet 30 tablet 0     Sig: Take 1 tablet by mouth daily for 30 days.

## 2024-02-19 NOTE — TELEPHONE ENCOUNTER
Spoke with patient.  Advised she would need an appointment to discuss adding wellbutrin.  Offered to schedule appointment.  Patient declined to schedule, stating if she needs the medication when her scheduled appointment in April comes around she will discuss at that time.  Advised patient to call the office if she changes her mind.  Patient stated understanding.

## 2024-02-28 DIAGNOSIS — F98.8 ATTENTION DEFICIT DISORDER, UNSPECIFIED HYPERACTIVITY PRESENCE: ICD-10-CM

## 2024-02-29 RX ORDER — DEXTROAMPHETAMINE SACCHARATE, AMPHETAMINE ASPARTATE, DEXTROAMPHETAMINE SULFATE AND AMPHETAMINE SULFATE 7.5; 7.5; 7.5; 7.5 MG/1; MG/1; MG/1; MG/1
30 TABLET ORAL DAILY
Qty: 30 TABLET | Refills: 0 | Status: SHIPPED | OUTPATIENT
Start: 2024-02-29 | End: 2024-03-30

## 2024-02-29 RX ORDER — DOXYCYCLINE HYCLATE 100 MG/1
100 CAPSULE ORAL 2 TIMES DAILY
Qty: 180 CAPSULE | Refills: 1 | Status: SHIPPED | OUTPATIENT
Start: 2024-02-29

## 2024-02-29 NOTE — TELEPHONE ENCOUNTER
Refill Request     CONFIRM preferred pharmacy with the patient.    If Mail Order Rx - Pend for 90 day refill.      Last Seen: Last Seen Department: 10/25/2023  Last Seen by PCP: 10/25/2023    Last Written: 12/03/2023, #180, 1 refill    If no future appointment scheduled:  Review the last OV with PCP and review information for follow-up visit,  Route STAFF MESSAGE with patient name to the  Pool for scheduling with the following information:            -  Timing of next visit           -  Visit type ie Physical, OV, etc           -  Diagnoses/Reason ie. COPD, HTN - Do not use MEDICATION, Follow-up or CHECK UP - Give reason for visit      Next Appointment:   Future Appointments   Date Time Provider Department Center   4/29/2024  9:30 AM Varinder Castillo, DO JOSELINE [facility] - [place]       Message sent to  to schedule appt with patient?  YES      Requested Prescriptions     Pending Prescriptions Disp Refills    doxycycline hyclate (VIBRAMYCIN) 100 MG capsule 180 capsule 1     Sig: Take 1 capsule by mouth 2 times daily rosacea

## 2024-02-29 NOTE — TELEPHONE ENCOUNTER
Refill Request - Controlled Substance    CONFIRM preferred pharmacy with the patient.    If Mail Order Rx - Pend for 90 day refill.        Last Seen Department: 10/25/2023  Last Seen by PCP: Visit date not found    Last Written: 2/19/2024     Last UDS: NA    Med Agreement Signed On: 7/16/2015    If no future appointment scheduled:  Review the last OV with PCP and review information for follow-up visit,  Route STAFF MESSAGE with patient name to the  Pool for scheduling with the following information:            -  Timing of next visit           -  Visit type ie Physical, OV, etc           -  Diagnoses/Reason ie. COPD, HTN - Do not use MEDICATION, Follow-up or CHECK UP - Give reason for visit        Next Appointment:   Future Appointments   Date Time Provider Department Center   4/29/2024  9:30 AM Varinder Castillo, DO JOSELINE [facility] - [place]       Message sent to  to schedule appt with patient?  NO      Requested Prescriptions     Pending Prescriptions Disp Refills    amphetamine-dextroamphetamine (ADDERALL) 30 MG tablet 30 tablet 0     Sig: Take 1 tablet by mouth daily for 30 days.

## 2024-04-16 LAB — PAP SMEAR, EXTERNAL: NEGATIVE

## 2024-04-29 ENCOUNTER — OFFICE VISIT (OUTPATIENT)
Dept: FAMILY MEDICINE CLINIC | Age: 49
End: 2024-04-29
Payer: COMMERCIAL

## 2024-04-29 VITALS
HEIGHT: 66 IN | SYSTOLIC BLOOD PRESSURE: 122 MMHG | BODY MASS INDEX: 24.56 KG/M2 | DIASTOLIC BLOOD PRESSURE: 82 MMHG | OXYGEN SATURATION: 99 % | HEART RATE: 116 BPM | WEIGHT: 152.8 LBS

## 2024-04-29 DIAGNOSIS — Z13.0 SCREENING, ANEMIA, DEFICIENCY, IRON: ICD-10-CM

## 2024-04-29 DIAGNOSIS — F98.8 ATTENTION DEFICIT DISORDER, UNSPECIFIED HYPERACTIVITY PRESENCE: ICD-10-CM

## 2024-04-29 DIAGNOSIS — Z13.220 SCREENING FOR LIPID DISORDERS: ICD-10-CM

## 2024-04-29 DIAGNOSIS — Z13.29 SCREENING FOR ENDOCRINE, NUTRITIONAL, METABOLIC AND IMMUNITY DISORDER: ICD-10-CM

## 2024-04-29 DIAGNOSIS — L71.9 ROSACEA: ICD-10-CM

## 2024-04-29 DIAGNOSIS — F98.8 ATTENTION DEFICIT DISORDER (ADD) WITHOUT HYPERACTIVITY: Primary | ICD-10-CM

## 2024-04-29 DIAGNOSIS — Z13.228 SCREENING FOR ENDOCRINE, NUTRITIONAL, METABOLIC AND IMMUNITY DISORDER: ICD-10-CM

## 2024-04-29 DIAGNOSIS — Z13.0 SCREENING FOR ENDOCRINE, NUTRITIONAL, METABOLIC AND IMMUNITY DISORDER: ICD-10-CM

## 2024-04-29 DIAGNOSIS — Z13.21 SCREENING FOR ENDOCRINE, NUTRITIONAL, METABOLIC AND IMMUNITY DISORDER: ICD-10-CM

## 2024-04-29 DIAGNOSIS — Z80.41 FAMILY HISTORY OF OVARIAN CANCER: ICD-10-CM

## 2024-04-29 DIAGNOSIS — G56.03 CARPAL TUNNEL SYNDROME ON BOTH SIDES: ICD-10-CM

## 2024-04-29 PROCEDURE — 99214 OFFICE O/P EST MOD 30 MIN: CPT | Performed by: FAMILY MEDICINE

## 2024-04-29 RX ORDER — DEXTROAMPHETAMINE SACCHARATE, AMPHETAMINE ASPARTATE, DEXTROAMPHETAMINE SULFATE AND AMPHETAMINE SULFATE 7.5; 7.5; 7.5; 7.5 MG/1; MG/1; MG/1; MG/1
30 TABLET ORAL DAILY
Qty: 30 TABLET | Refills: 0 | Status: SHIPPED | OUTPATIENT
Start: 2024-04-29 | End: 2024-05-29

## 2024-04-29 ASSESSMENT — ENCOUNTER SYMPTOMS
EYE PAIN: 0
VOMITING: 0
RHINORRHEA: 0
EYE DISCHARGE: 0
NAUSEA: 0
SINUS PRESSURE: 0
WHEEZING: 0
TROUBLE SWALLOWING: 0
CHEST TIGHTNESS: 0
VOICE CHANGE: 0
BACK PAIN: 0
ABDOMINAL DISTENTION: 0
EYE REDNESS: 0
CONSTIPATION: 0
COUGH: 0
SORE THROAT: 0
SHORTNESS OF BREATH: 0
BLOOD IN STOOL: 0
ABDOMINAL PAIN: 0
ANAL BLEEDING: 0
DIARRHEA: 0
EYE ITCHING: 0

## 2024-04-29 ASSESSMENT — PATIENT HEALTH QUESTIONNAIRE - PHQ9
SUM OF ALL RESPONSES TO PHQ QUESTIONS 1-9: 6
10. IF YOU CHECKED OFF ANY PROBLEMS, HOW DIFFICULT HAVE THESE PROBLEMS MADE IT FOR YOU TO DO YOUR WORK, TAKE CARE OF THINGS AT HOME, OR GET ALONG WITH OTHER PEOPLE: NOT DIFFICULT AT ALL
5. POOR APPETITE OR OVEREATING: NOT AT ALL
SUM OF ALL RESPONSES TO PHQ QUESTIONS 1-9: 6
SUM OF ALL RESPONSES TO PHQ9 QUESTIONS 1 & 2: 1
DEPRESSION UNABLE TO ASSESS: FUNCTIONAL CAPACITY MOTIVATION LIMITS ACCURACY
3. TROUBLE FALLING OR STAYING ASLEEP: MORE THAN HALF THE DAYS
8. MOVING OR SPEAKING SO SLOWLY THAT OTHER PEOPLE COULD HAVE NOTICED. OR THE OPPOSITE, BEING SO FIGETY OR RESTLESS THAT YOU HAVE BEEN MOVING AROUND A LOT MORE THAN USUAL: SEVERAL DAYS
6. FEELING BAD ABOUT YOURSELF - OR THAT YOU ARE A FAILURE OR HAVE LET YOURSELF OR YOUR FAMILY DOWN: NOT AT ALL
SUM OF ALL RESPONSES TO PHQ QUESTIONS 1-9: 6
9. THOUGHTS THAT YOU WOULD BE BETTER OFF DEAD, OR OF HURTING YOURSELF: NOT AT ALL
7. TROUBLE CONCENTRATING ON THINGS, SUCH AS READING THE NEWSPAPER OR WATCHING TELEVISION: SEVERAL DAYS
4. FEELING TIRED OR HAVING LITTLE ENERGY: SEVERAL DAYS
1. LITTLE INTEREST OR PLEASURE IN DOING THINGS: NOT AT ALL
SUM OF ALL RESPONSES TO PHQ QUESTIONS 1-9: 6
2. FEELING DOWN, DEPRESSED OR HOPELESS: SEVERAL DAYS

## 2024-04-29 NOTE — PROGRESS NOTES
Subjective:      Patient ID: Marquita Alejandra is a 48 y.o. female.    HPI  Patient in for 6-month checkup on ADD.  She states she actually only takes a half a pill daily and does very well.  Rosacea-she is on her antibiotic and also does well.  Carpal tunnel syndrome she does have some symptoms but they do not seem to be progressing and she has not had any surgery and states she is doing okay at the present time.  She is due for some lab work today-colonoscopy and mammogram are up-to-date.        Review of Systems    Review of Systems   Constitutional:  Negative for fatigue and unexpected weight change.   HENT:  Negative for congestion, ear pain, hearing loss, nosebleeds, postnasal drip, rhinorrhea, sinus pressure, sneezing, sore throat, tinnitus, trouble swallowing and voice change.    Eyes:  Negative for pain, discharge, redness, itching and visual disturbance.   Respiratory:  Negative for cough, chest tightness, shortness of breath and wheezing.    Cardiovascular:  Negative for chest pain, palpitations and leg swelling.   Gastrointestinal:  Negative for abdominal distention, abdominal pain, anal bleeding, blood in stool, constipation, diarrhea, nausea and vomiting.   Genitourinary:  Positive for frequency. Negative for dysuria, flank pain, hematuria, menstrual problem, pelvic pain, urgency and vaginal discharge.   Musculoskeletal:  Negative for arthralgias, back pain, gait problem, joint swelling, myalgias and neck pain.   Skin:  Negative for pallor and rash.   Neurological:  Negative for dizziness, tremors, seizures, weakness, light-headedness, numbness and headaches.   Hematological:  Negative for adenopathy. Does not bruise/bleed easily.   Psychiatric/Behavioral:  Positive for decreased concentration. Negative for agitation, confusion and sleep disturbance. The patient is not nervous/anxious and is not hyperactive.        Objective:   Physical Exam      Physical Exam  Constitutional:       General: She is not

## 2024-04-30 DIAGNOSIS — F98.8 ATTENTION DEFICIT DISORDER, UNSPECIFIED HYPERACTIVITY PRESENCE: ICD-10-CM

## 2024-04-30 RX ORDER — DEXTROAMPHETAMINE SACCHARATE, AMPHETAMINE ASPARTATE, DEXTROAMPHETAMINE SULFATE AND AMPHETAMINE SULFATE 7.5; 7.5; 7.5; 7.5 MG/1; MG/1; MG/1; MG/1
30 TABLET ORAL DAILY
Qty: 30 TABLET | Refills: 0 | OUTPATIENT
Start: 2024-04-30 | End: 2024-05-30

## 2024-04-30 RX ORDER — DOXYCYCLINE HYCLATE 100 MG/1
100 CAPSULE ORAL 2 TIMES DAILY
Qty: 180 CAPSULE | Refills: 1 | OUTPATIENT
Start: 2024-04-30

## 2024-04-30 NOTE — TELEPHONE ENCOUNTER
Adderall sent in on 4/29/24 and Vibromycin was sent on 2/29/24 for a 90 day supply with 1 refill.

## 2024-05-02 LAB
6MAM UR QL: NOT DETECTED
7AMINOCLONAZEPAM UR QL: NOT DETECTED
A-OH ALPRAZ UR QL: NOT DETECTED
ALPHA-OH-MIDAZOLAM, URINE: NOT DETECTED
ALPRAZ UR QL: NOT DETECTED
AMPHET UR QL SCN: PRESENT
ANNOTATION COMMENT IMP: NORMAL
ANNOTATION COMMENT IMP: NORMAL
BARBITURATES UR QL: NEGATIVE
BUPRENORPHINE UR QL: NOT DETECTED
BZE UR QL: NEGATIVE
CARBOXYTHC UR QL: NEGATIVE
CARISOPRODOL UR QL: NEGATIVE
CLONAZEPAM UR QL: NOT DETECTED
CODEINE UR QL: NOT DETECTED
CREAT UR-MCNC: 38.9 MG/DL (ref 20–400)
DIAZEPAM UR QL: NOT DETECTED
ETHYL GLUCURONIDE UR QL: NEGATIVE
FENTANYL UR QL: NOT DETECTED
GABAPENTIN: NOT DETECTED
HYDROCODONE UR QL: NOT DETECTED
HYDROMORPHONE UR QL: NOT DETECTED
LORAZEPAM UR QL: NOT DETECTED
MDA UR QL: NOT DETECTED
MDEA UR QL: NOT DETECTED
MDMA UR QL: NOT DETECTED
MEPERIDINE UR QL: NOT DETECTED
METHADONE UR QL: NEGATIVE
METHAMPHET UR QL: NOT DETECTED
MIDAZOLAM UR QL SCN: NOT DETECTED
MORPHINE UR QL: NOT DETECTED
NALOXONE: NOT DETECTED
NORBUPRENORPHINE UR QL CFM: NOT DETECTED
NORDIAZEPAM UR QL: NOT DETECTED
NORFENTANYL UR QL: NOT DETECTED
NORHYDROCODONE UR QL CFM: NOT DETECTED
NOROXYCODONE UR QL CFM: NOT DETECTED
NOROXYMORPHONE, URINE: NOT DETECTED
OXAZEPAM UR QL: NOT DETECTED
OXYCODONE UR QL: NOT DETECTED
OXYMORPHONE UR QL: NOT DETECTED
PATHOLOGY STUDY: NORMAL
PCP UR QL: NEGATIVE
PHENTERMINE UR QL: NOT DETECTED
PPAA UR QL: NOT DETECTED
PREGABALIN: NOT DETECTED
SERVICE CMNT-IMP: NORMAL
TAPENTADOL UR QL SCN: NOT DETECTED
TAPENTADOL-O-SULFATE, URINE: NOT DETECTED
TEMAZEPAM UR QL: NOT DETECTED
TRAMADOL UR QL: NEGATIVE
ZOLPIDEM UR QL: NOT DETECTED

## 2024-06-08 DIAGNOSIS — F98.8 ATTENTION DEFICIT DISORDER, UNSPECIFIED HYPERACTIVITY PRESENCE: ICD-10-CM

## 2024-06-08 NOTE — TELEPHONE ENCOUNTER
Refill Request - Controlled Substance    CONFIRM preferred pharmacy with the patient.    If Mail Order Rx - Pend for 90 day refill.        Last Seen Department: 4/29/2024  Last Seen by PCP: 4/29/2024    Last Written: 2/29/2024 Doxycycline  4/29/2024 Adderall     Last UDS: 4/29/2024    Med Agreement Signed On: 4/29/2024    If no future appointment scheduled:  Review the last OV with PCP and review information for follow-up visit,  Route STAFF MESSAGE with patient name to the  Pool for scheduling with the following information:            -  Timing of next visit           -  Visit type ie Physical, OV, etc           -  Diagnoses/Reason ie. COPD, HTN - Do not use MEDICATION, Follow-up or CHECK UP - Give reason for visit        Next Appointment:   Future Appointments   Date Time Provider Department Center   11/13/2024  8:30 AM Varinder Castillo DO [facility]       Message sent to  to schedule appt with patient?  NO      Requested Prescriptions     Pending Prescriptions Disp Refills    doxycycline hyclate (VIBRAMYCIN) 100 MG capsule 180 capsule 1     Sig: Take 1 capsule by mouth 2 times daily rosacea    amphetamine-dextroamphetamine (ADDERALL) 30 MG tablet 30 tablet 0     Sig: Take 1 tablet by mouth daily for 30 days.

## 2024-06-09 RX ORDER — DOXYCYCLINE HYCLATE 100 MG/1
100 CAPSULE ORAL 2 TIMES DAILY
Qty: 180 CAPSULE | Refills: 1 | Status: SHIPPED | OUTPATIENT
Start: 2024-06-09

## 2024-06-09 RX ORDER — DEXTROAMPHETAMINE SACCHARATE, AMPHETAMINE ASPARTATE, DEXTROAMPHETAMINE SULFATE AND AMPHETAMINE SULFATE 7.5; 7.5; 7.5; 7.5 MG/1; MG/1; MG/1; MG/1
30 TABLET ORAL DAILY
Qty: 30 TABLET | Refills: 0 | Status: SHIPPED | OUTPATIENT
Start: 2024-06-09 | End: 2024-07-09

## 2024-07-18 DIAGNOSIS — F98.8 ATTENTION DEFICIT DISORDER, UNSPECIFIED HYPERACTIVITY PRESENCE: ICD-10-CM

## 2024-07-19 RX ORDER — DEXTROAMPHETAMINE SACCHARATE, AMPHETAMINE ASPARTATE, DEXTROAMPHETAMINE SULFATE AND AMPHETAMINE SULFATE 7.5; 7.5; 7.5; 7.5 MG/1; MG/1; MG/1; MG/1
30 TABLET ORAL DAILY
Qty: 30 TABLET | Refills: 0 | Status: SHIPPED | OUTPATIENT
Start: 2024-07-19 | End: 2024-08-18

## 2024-07-19 NOTE — TELEPHONE ENCOUNTER
Refill Request - Controlled Substance    CONFIRM preferred pharmacy with the patient.    If Mail Order Rx - Pend for 90 day refill.        Last Seen Department: 4/29/2024  Last Seen by PCP: 4/29/2024    Last Written: 6/9/24 30 tab 0 refills    Last UDS: 4/29/24    Med Agreement Signed On: 4/29/24    If no future appointment scheduled:  Review the last OV with PCP and review information for follow-up visit,  Route STAFF MESSAGE with patient name to the  Pool for scheduling with the following information:            -  Timing of next visit           -  Visit type ie Physical, OV, etc           -  Diagnoses/Reason ie. COPD, HTN - Do not use MEDICATION, Follow-up or CHECK UP - Give reason for visit        Next Appointment:   Future Appointments   Date Time Provider Department Center   11/13/2024  8:30 AM Varinder Castillo, DO [facility] [place] [facility]       Message sent to  to schedule appt with patient?  NO      Requested Prescriptions     Pending Prescriptions Disp Refills    amphetamine-dextroamphetamine (ADDERALL) 30 MG tablet 30 tablet 0     Sig: Take 1 tablet by mouth daily for 30 days.

## 2024-09-05 DIAGNOSIS — F98.8 ATTENTION DEFICIT DISORDER, UNSPECIFIED HYPERACTIVITY PRESENCE: ICD-10-CM

## 2024-09-05 RX ORDER — DEXTROAMPHETAMINE SACCHARATE, AMPHETAMINE ASPARTATE, DEXTROAMPHETAMINE SULFATE AND AMPHETAMINE SULFATE 7.5; 7.5; 7.5; 7.5 MG/1; MG/1; MG/1; MG/1
30 TABLET ORAL DAILY
Qty: 30 TABLET | Refills: 0 | Status: SHIPPED | OUTPATIENT
Start: 2024-09-05 | End: 2024-10-05

## 2024-09-05 NOTE — TELEPHONE ENCOUNTER
.Refill Request - Controlled Substance    CONFIRM preferred pharmacy with the patient.    If Mail Order Rx - Pend for 90 day refill.        Last Seen Department: 4/29/2024  Last Seen by PCP: Visit date not found    Last Written: 7-19-24 30 with 0     Last UDS: 4-29-24    Med Agreement Signed On: 7-16-15    If no future appointment scheduled:  Review the last OV with PCP and review information for follow-up visit,  Route STAFF MESSAGE with patient name to the  Pool for scheduling with the following information:            -  Timing of next visit           -  Visit type ie Physical, OV, etc           -  Diagnoses/Reason ie. COPD, HTN - Do not use MEDICATION, Follow-up or CHECK UP - Give reason for visit        Next Appointment:   Future Appointments   Date Time Provider Department Center   11/13/2024  8:30 AM Varinder Castillo DO [facility]       Message sent to  to schedule appt with patient?  NO      Requested Prescriptions     Pending Prescriptions Disp Refills    amphetamine-dextroamphetamine (ADDERALL) 30 MG tablet 30 tablet 0     Sig: Take 1 tablet by mouth daily for 30 days.

## 2024-09-24 ENCOUNTER — PATIENT MESSAGE (OUTPATIENT)
Dept: FAMILY MEDICINE CLINIC | Age: 49
End: 2024-09-24

## 2024-09-25 DIAGNOSIS — F33.8 SEASONAL DEPRESSION (HCC): Primary | ICD-10-CM

## 2024-09-25 DIAGNOSIS — F98.8 ATTENTION DEFICIT DISORDER, UNSPECIFIED HYPERACTIVITY PRESENCE: ICD-10-CM

## 2024-09-25 RX ORDER — DEXTROAMPHETAMINE SACCHARATE, AMPHETAMINE ASPARTATE, DEXTROAMPHETAMINE SULFATE AND AMPHETAMINE SULFATE 7.5; 7.5; 7.5; 7.5 MG/1; MG/1; MG/1; MG/1
30 TABLET ORAL DAILY
Qty: 30 TABLET | Refills: 0 | Status: SHIPPED | OUTPATIENT
Start: 2024-09-25 | End: 2024-10-25

## 2024-09-25 RX ORDER — BUPROPION HYDROCHLORIDE 150 MG/1
TABLET, EXTENDED RELEASE ORAL
Qty: 90 TABLET | Refills: 0 | Status: SHIPPED | OUTPATIENT
Start: 2024-09-25

## 2024-10-30 DIAGNOSIS — F98.8 ATTENTION DEFICIT DISORDER, UNSPECIFIED TYPE: ICD-10-CM

## 2024-10-30 DIAGNOSIS — F33.8 SEASONAL DEPRESSION (HCC): ICD-10-CM

## 2024-10-30 RX ORDER — DEXTROAMPHETAMINE SACCHARATE, AMPHETAMINE ASPARTATE, DEXTROAMPHETAMINE SULFATE AND AMPHETAMINE SULFATE 7.5; 7.5; 7.5; 7.5 MG/1; MG/1; MG/1; MG/1
30 TABLET ORAL DAILY
Qty: 30 TABLET | Refills: 0 | Status: SHIPPED | OUTPATIENT
Start: 2024-10-30 | End: 2024-11-29

## 2024-10-30 RX ORDER — BUPROPION HYDROCHLORIDE 150 MG/1
TABLET, EXTENDED RELEASE ORAL
Qty: 90 TABLET | Refills: 0 | Status: SHIPPED | OUTPATIENT
Start: 2024-10-30

## 2024-10-30 NOTE — TELEPHONE ENCOUNTER
Refill Request - Controlled Substance    CONFIRM preferred pharmacy with the patient.    If Mail Order Rx - Pend for 90 day refill.        Last Seen Department: 2024  Last Seen by PCP: 2024    Last Written: 2024    Last UDS: 2024    Med Agreement Signed On: 2024    If no future appointment scheduled:  Review the last OV with PCP and review information for follow-up visit,  Route STAFF MESSAGE with patient name to the  Pool for scheduling with the following information:            -  Timing of next visit           -  Visit type ie Physical, OV, etc           -  Diagnoses/Reason ie. COPD, HTN - Do not use MEDICATION, Follow-up or CHECK UP - Give reason for visit        Next Appointment:   Future Appointments   Date Time Provider Department Center   2024  8:30 AM Varinder Castillo DO [facility]       Message sent to  to schedule appt with patient?  NO      Requested Prescriptions     Pending Prescriptions Disp Refills    buPROPion (WELLBUTRIN SR) 150 MG extended release tablet 90 tablet 0     Si daily in the morning and after 10 to 12 days could increase to 2 daily in the morning if needed    amphetamine-dextroamphetamine (ADDERALL) 30 MG tablet 30 tablet 0     Sig: Take 1 tablet by mouth daily for 30 days.

## 2024-11-13 ENCOUNTER — OFFICE VISIT (OUTPATIENT)
Dept: FAMILY MEDICINE CLINIC | Age: 49
End: 2024-11-13

## 2024-11-13 VITALS
OXYGEN SATURATION: 97 % | TEMPERATURE: 97.3 F | HEIGHT: 66 IN | BODY MASS INDEX: 24.72 KG/M2 | WEIGHT: 153.8 LBS | HEART RATE: 97 BPM | SYSTOLIC BLOOD PRESSURE: 118 MMHG | DIASTOLIC BLOOD PRESSURE: 78 MMHG

## 2024-11-13 DIAGNOSIS — Z13.228 SCREENING FOR ENDOCRINE, NUTRITIONAL, METABOLIC AND IMMUNITY DISORDER: ICD-10-CM

## 2024-11-13 DIAGNOSIS — F98.8 ATTENTION DEFICIT DISORDER, UNSPECIFIED TYPE: Primary | ICD-10-CM

## 2024-11-13 DIAGNOSIS — Z13.29 SCREENING FOR ENDOCRINE, NUTRITIONAL, METABOLIC AND IMMUNITY DISORDER: ICD-10-CM

## 2024-11-13 DIAGNOSIS — Z13.0 SCREENING FOR ENDOCRINE, NUTRITIONAL, METABOLIC AND IMMUNITY DISORDER: ICD-10-CM

## 2024-11-13 DIAGNOSIS — Z13.21 SCREENING FOR ENDOCRINE, NUTRITIONAL, METABOLIC AND IMMUNITY DISORDER: ICD-10-CM

## 2024-11-13 DIAGNOSIS — Z23 NEED FOR TDAP VACCINATION: ICD-10-CM

## 2024-11-13 DIAGNOSIS — F43.21 ADJUSTMENT DISORDER WITH DEPRESSED MOOD: ICD-10-CM

## 2024-11-13 DIAGNOSIS — Z13.220 SCREENING FOR LIPID DISORDERS: ICD-10-CM

## 2024-11-13 DIAGNOSIS — Z13.0 SCREENING, ANEMIA, DEFICIENCY, IRON: ICD-10-CM

## 2024-11-13 DIAGNOSIS — L71.9 ROSACEA: ICD-10-CM

## 2024-11-13 LAB
ALBUMIN SERPL-MCNC: 4.1 G/DL (ref 3.4–5)
ALBUMIN/GLOB SERPL: 1.7 {RATIO} (ref 1.1–2.2)
ALP SERPL-CCNC: 96 U/L (ref 40–129)
ALT SERPL-CCNC: 16 U/L (ref 10–40)
ANION GAP SERPL CALCULATED.3IONS-SCNC: 9 MMOL/L (ref 3–16)
AST SERPL-CCNC: 17 U/L (ref 15–37)
BASOPHILS # BLD: 0 K/UL (ref 0–0.2)
BASOPHILS NFR BLD: 0.5 %
BILIRUB SERPL-MCNC: 0.3 MG/DL (ref 0–1)
BUN SERPL-MCNC: 11 MG/DL (ref 7–20)
CALCIUM SERPL-MCNC: 9.7 MG/DL (ref 8.3–10.6)
CHLORIDE SERPL-SCNC: 101 MMOL/L (ref 99–110)
CHOLEST SERPL-MCNC: 222 MG/DL (ref 0–199)
CO2 SERPL-SCNC: 26 MMOL/L (ref 21–32)
CREAT SERPL-MCNC: 0.7 MG/DL (ref 0.6–1.1)
DEPRECATED RDW RBC AUTO: 14 % (ref 12.4–15.4)
EOSINOPHIL # BLD: 0.2 K/UL (ref 0–0.6)
EOSINOPHIL NFR BLD: 1.6 %
GFR SERPLBLD CREATININE-BSD FMLA CKD-EPI: >90 ML/MIN/{1.73_M2}
GLUCOSE SERPL-MCNC: 85 MG/DL (ref 70–99)
HCT VFR BLD AUTO: 41 % (ref 36–48)
HDLC SERPL-MCNC: 51 MG/DL (ref 40–60)
HGB BLD-MCNC: 13.9 G/DL (ref 12–16)
LDLC SERPL CALC-MCNC: 137 MG/DL
LYMPHOCYTES # BLD: 2.2 K/UL (ref 1–5.1)
LYMPHOCYTES NFR BLD: 22.2 %
MCH RBC QN AUTO: 30.4 PG (ref 26–34)
MCHC RBC AUTO-ENTMCNC: 33.8 G/DL (ref 31–36)
MCV RBC AUTO: 89.8 FL (ref 80–100)
MONOCYTES # BLD: 0.7 K/UL (ref 0–1.3)
MONOCYTES NFR BLD: 6.6 %
NEUTROPHILS # BLD: 7 K/UL (ref 1.7–7.7)
NEUTROPHILS NFR BLD: 69.1 %
PLATELET # BLD AUTO: 251 K/UL (ref 135–450)
PMV BLD AUTO: 9.2 FL (ref 5–10.5)
POTASSIUM SERPL-SCNC: 4.6 MMOL/L (ref 3.5–5.1)
PROT SERPL-MCNC: 6.5 G/DL (ref 6.4–8.2)
RBC # BLD AUTO: 4.57 M/UL (ref 4–5.2)
SODIUM SERPL-SCNC: 136 MMOL/L (ref 136–145)
T4 FREE SERPL-MCNC: 0.9 NG/DL (ref 0.9–1.8)
TRIGL SERPL-MCNC: 171 MG/DL (ref 0–150)
TSH SERPL DL<=0.005 MIU/L-ACNC: 2.84 UIU/ML (ref 0.27–4.2)
VLDLC SERPL CALC-MCNC: 34 MG/DL
WBC # BLD AUTO: 10.1 K/UL (ref 4–11)

## 2024-11-13 RX ORDER — OXYCODONE HYDROCHLORIDE 5 MG/1
5 TABLET ORAL EVERY 6 HOURS PRN
COMMUNITY
Start: 2024-10-10 | End: 2024-11-13

## 2024-11-13 SDOH — ECONOMIC STABILITY: FOOD INSECURITY: WITHIN THE PAST 12 MONTHS, THE FOOD YOU BOUGHT JUST DIDN'T LAST AND YOU DIDN'T HAVE MONEY TO GET MORE.: NEVER TRUE

## 2024-11-13 SDOH — ECONOMIC STABILITY: FOOD INSECURITY: WITHIN THE PAST 12 MONTHS, YOU WORRIED THAT YOUR FOOD WOULD RUN OUT BEFORE YOU GOT MONEY TO BUY MORE.: NEVER TRUE

## 2024-11-13 SDOH — ECONOMIC STABILITY: INCOME INSECURITY: HOW HARD IS IT FOR YOU TO PAY FOR THE VERY BASICS LIKE FOOD, HOUSING, MEDICAL CARE, AND HEATING?: NOT HARD AT ALL

## 2024-11-13 ASSESSMENT — ENCOUNTER SYMPTOMS
EYE PAIN: 0
EYE DISCHARGE: 0
EYE ITCHING: 0
SHORTNESS OF BREATH: 0
VOICE CHANGE: 0
COUGH: 0
RHINORRHEA: 0
SORE THROAT: 0
CHEST TIGHTNESS: 0
ABDOMINAL DISTENTION: 0
ANAL BLEEDING: 0
BLOOD IN STOOL: 0
TROUBLE SWALLOWING: 0
ABDOMINAL PAIN: 0
EYE REDNESS: 0
VOMITING: 0
BACK PAIN: 0
DIARRHEA: 0
NAUSEA: 0
CONSTIPATION: 0
SINUS PRESSURE: 0
WHEEZING: 0

## 2024-11-13 NOTE — PROGRESS NOTES
Subjective:      Patient ID: Marquita Alejandra is a 49 y.o. female.    HPI  Patient in for 6-month checkup on several medical issues ADD-on medication and well-controlled.  Depression on medication and stable.  She is due for some lab work today.  Her rosacea is under control with medication.  Colonoscopy will be due in 2028.        Review of Systems    Review of Systems   Constitutional:  Negative for fatigue and unexpected weight change.   HENT:  Negative for congestion, ear pain, hearing loss, nosebleeds, postnasal drip, rhinorrhea, sinus pressure, sneezing, sore throat, tinnitus, trouble swallowing and voice change.    Eyes:  Negative for pain, discharge, redness, itching and visual disturbance.   Respiratory:  Negative for cough, chest tightness, shortness of breath and wheezing.    Cardiovascular:  Negative for chest pain, palpitations and leg swelling.   Gastrointestinal:  Negative for abdominal distention, abdominal pain, anal bleeding, blood in stool, constipation, diarrhea, nausea and vomiting.   Genitourinary:  Negative for dysuria, flank pain, frequency, hematuria, menstrual problem, pelvic pain, urgency and vaginal discharge.   Musculoskeletal:  Negative for arthralgias, back pain, gait problem, joint swelling, myalgias and neck pain.   Skin:  Negative for pallor and rash.   Neurological:  Negative for dizziness, tremors, seizures, weakness, light-headedness, numbness and headaches.   Hematological:  Negative for adenopathy. Does not bruise/bleed easily.   Psychiatric/Behavioral:  Positive for dysphoric mood. Negative for agitation, confusion, decreased concentration and sleep disturbance. The patient is not nervous/anxious and is not hyperactive.        Objective:   Physical Exam      Physical Exam  Constitutional:       General: She is not in acute distress.     Appearance: Normal appearance. She is well-developed and normal weight. She is not ill-appearing.   HENT:      Head: Normocephalic.

## 2024-12-01 ENCOUNTER — PATIENT MESSAGE (OUTPATIENT)
Dept: FAMILY MEDICINE CLINIC | Age: 49
End: 2024-12-01

## 2024-12-10 DIAGNOSIS — F98.8 ATTENTION DEFICIT DISORDER, UNSPECIFIED TYPE: ICD-10-CM

## 2024-12-10 RX ORDER — DEXTROAMPHETAMINE SACCHARATE, AMPHETAMINE ASPARTATE, DEXTROAMPHETAMINE SULFATE AND AMPHETAMINE SULFATE 7.5; 7.5; 7.5; 7.5 MG/1; MG/1; MG/1; MG/1
30 TABLET ORAL DAILY
Qty: 30 TABLET | Refills: 0 | Status: SHIPPED | OUTPATIENT
Start: 2024-12-10 | End: 2025-01-09

## 2024-12-10 NOTE — TELEPHONE ENCOUNTER
Refill Request - Controlled Substance    CONFIRM preferred pharmacy with the patient.    If Mail Order Rx - Pend for 90 day refill.        Last Seen Department: 11/13/2024  Last Seen by PCP: 11/13/2024    Last Written: 10/30/2024    Last UDS: 4/29/2024    Med Agreement Signed On: 4/29/2024    If no future appointment scheduled:  Review the last OV with PCP and review information for follow-up visit,  Route STAFF MESSAGE with patient name to the  Pool for scheduling with the following information:            -  Timing of next visit           -  Visit type ie Physical, OV, etc           -  Diagnoses/Reason ie. COPD, HTN - Do not use MEDICATION, Follow-up or CHECK UP - Give reason for visit        Next Appointment:   Future Appointments   Date Time Provider Department Center   5/14/2025 10:00 AM Varinder Castillo DO [facility]       Message sent to  to schedule appt with patient?  NO      Requested Prescriptions     Pending Prescriptions Disp Refills    amphetamine-dextroamphetamine (ADDERALL) 30 MG tablet 30 tablet 0     Sig: Take 1 tablet by mouth daily for 30 days.

## 2024-12-30 DIAGNOSIS — F33.8 SEASONAL DEPRESSION (HCC): ICD-10-CM

## 2024-12-30 RX ORDER — BUPROPION HYDROCHLORIDE 150 MG/1
TABLET, EXTENDED RELEASE ORAL
Qty: 90 TABLET | Refills: 1 | Status: SHIPPED | OUTPATIENT
Start: 2024-12-30

## 2024-12-30 NOTE — TELEPHONE ENCOUNTER
Refill Request     CONFIRM preferred pharmacy with the patient.    If Mail Order Rx - Pend for 90 day refill.      Last Seen: Last Seen Department: 11/13/2024  Last Seen by PCP: 11/13/2024    Last Written: 10/30/24 90 with no refills     If no future appointment scheduled:  Review the last OV with PCP and review information for follow-up visit,  Route STAFF MESSAGE with patient name to the  Pool for scheduling with the following information:            -  Timing of next visit           -  Visit type ie Physical, OV, etc           -  Diagnoses/Reason ie. COPD, HTN - Do not use MEDICATION, Follow-up or CHECK UP - Give reason for visit      Next Appointment:   Future Appointments   Date Time Provider Department Center   5/14/2025 10:00 AM Varinder Castillo DO [facility]       Message sent to  to schedule appt with patient?  NO      Requested Prescriptions     Pending Prescriptions Disp Refills    buPROPion (WELLBUTRIN SR) 150 MG extended release tablet [Pharmacy Med Name: BUPROPION SR 150MG TABLETS (12 H)] 90 tablet 0     Sig: TAKE 1 TABLET BY MOUTH IN THE MORNING AND AFTER 10 TO 12 DAYS, INCREASE TO 2 TABLETS BY MOUTH DAILY IN THE MORNING IF NEEDED

## 2025-01-16 DIAGNOSIS — F98.8 ATTENTION DEFICIT DISORDER, UNSPECIFIED TYPE: ICD-10-CM

## 2025-01-17 RX ORDER — DEXTROAMPHETAMINE SACCHARATE, AMPHETAMINE ASPARTATE, DEXTROAMPHETAMINE SULFATE AND AMPHETAMINE SULFATE 7.5; 7.5; 7.5; 7.5 MG/1; MG/1; MG/1; MG/1
30 TABLET ORAL DAILY
Qty: 30 TABLET | Refills: 0 | Status: SHIPPED | OUTPATIENT
Start: 2025-01-17 | End: 2025-02-16

## 2025-01-17 NOTE — TELEPHONE ENCOUNTER
Refill Request - Controlled Substance    CONFIRM preferred pharmacy with the patient.    If Mail Order Rx - Pend for 90 day refill.        Last Seen Department: 11/13/2024  Last Seen by PCP: 11/13/2024    Last Written: 1/9/25 30 with no refills     Last UDS: 4/29/24     Med Agreement Signed On: 4/29/24     If no future appointment scheduled:  Review the last OV with PCP and review information for follow-up visit,  Route STAFF MESSAGE with patient name to the  Pool for scheduling with the following information:            -  Timing of next visit           -  Visit type ie Physical, OV, etc           -  Diagnoses/Reason ie. COPD, HTN - Do not use MEDICATION, Follow-up or CHECK UP - Give reason for visit        Next Appointment:   Future Appointments   Date Time Provider Department Center   5/14/2025 10:00 AM Varinder Castillo DO [facility]       Message sent to  to schedule appt with patient?  NO      Requested Prescriptions     Pending Prescriptions Disp Refills    amphetamine-dextroamphetamine (ADDERALL) 30 MG tablet 30 tablet 0     Sig: Take 1 tablet by mouth daily for 30 days.

## 2025-03-10 DIAGNOSIS — F98.8 ATTENTION DEFICIT DISORDER, UNSPECIFIED TYPE: ICD-10-CM

## 2025-03-10 RX ORDER — DEXTROAMPHETAMINE SACCHARATE, AMPHETAMINE ASPARTATE, DEXTROAMPHETAMINE SULFATE AND AMPHETAMINE SULFATE 7.5; 7.5; 7.5; 7.5 MG/1; MG/1; MG/1; MG/1
30 TABLET ORAL DAILY
Qty: 30 TABLET | Refills: 0 | Status: SHIPPED | OUTPATIENT
Start: 2025-03-10 | End: 2025-04-09

## 2025-03-10 NOTE — TELEPHONE ENCOUNTER
Refill Request - Controlled Substance    CONFIRM preferred pharmacy with the patient.    If Mail Order Rx - Pend for 90 day refill.        Last Seen Department: 11/13/2024  Last Seen by PCP: 11/13/2024    Last Written: 1/17/2025    Last UDS: 4/29/2024    Med Agreement Signed On: 4/29/2024    If no future appointment scheduled:  Review the last OV with PCP and review information for follow-up visit,  Route STAFF MESSAGE with patient name to the  Pool for scheduling with the following information:            -  Timing of next visit           -  Visit type ie Physical, OV, etc           -  Diagnoses/Reason ie. COPD, HTN - Do not use MEDICATION, Follow-up or CHECK UP - Give reason for visit        Next Appointment:   Future Appointments   Date Time Provider Department Center   5/14/2025 10:00 AM Ellyn Roberts MD [facility]       Message sent to  to schedule appt with patient?  NO      Requested Prescriptions     Pending Prescriptions Disp Refills    amphetamine-dextroamphetamine (ADDERALL) 30 MG tablet 30 tablet 0     Sig: Take 1 tablet by mouth daily for 30 days.

## 2025-05-16 ENCOUNTER — OFFICE VISIT (OUTPATIENT)
Dept: FAMILY MEDICINE CLINIC | Age: 50
End: 2025-05-16

## 2025-05-16 ENCOUNTER — HOSPITAL ENCOUNTER (OUTPATIENT)
Dept: GENERAL RADIOLOGY | Age: 50
Discharge: HOME | End: 2025-05-16
Payer: COMMERCIAL

## 2025-05-16 VITALS
DIASTOLIC BLOOD PRESSURE: 76 MMHG | BODY MASS INDEX: 24.59 KG/M2 | HEIGHT: 66 IN | OXYGEN SATURATION: 98 % | TEMPERATURE: 98.3 F | RESPIRATION RATE: 16 BRPM | WEIGHT: 153 LBS | SYSTOLIC BLOOD PRESSURE: 132 MMHG | HEART RATE: 87 BPM

## 2025-05-16 DIAGNOSIS — M54.2 NECK PAIN: ICD-10-CM

## 2025-05-16 DIAGNOSIS — M54.2 NECK PAIN: Primary | ICD-10-CM

## 2025-05-16 PROCEDURE — 72050 X-RAY EXAM NECK SPINE 4/5VWS: CPT

## 2025-05-16 SDOH — ECONOMIC STABILITY: FOOD INSECURITY: WITHIN THE PAST 12 MONTHS, THE FOOD YOU BOUGHT JUST DIDN'T LAST AND YOU DIDN'T HAVE MONEY TO GET MORE.: NEVER TRUE

## 2025-05-16 SDOH — ECONOMIC STABILITY: FOOD INSECURITY: WITHIN THE PAST 12 MONTHS, YOU WORRIED THAT YOUR FOOD WOULD RUN OUT BEFORE YOU GOT MONEY TO BUY MORE.: NEVER TRUE

## 2025-05-16 ASSESSMENT — ENCOUNTER SYMPTOMS
RHINORRHEA: 0
ABDOMINAL DISTENTION: 0
CONSTIPATION: 0
RESPIRATORY NEGATIVE: 1
VOMITING: 0
TROUBLE SWALLOWING: 0
NAUSEA: 0
COLOR CHANGE: 0
SHORTNESS OF BREATH: 0
ABDOMINAL PAIN: 0
GASTROINTESTINAL NEGATIVE: 1
DIARRHEA: 0
SORE THROAT: 0
COUGH: 0
BLOOD IN STOOL: 0
CHEST TIGHTNESS: 0
WHEEZING: 0

## 2025-05-16 ASSESSMENT — PATIENT HEALTH QUESTIONNAIRE - PHQ9
4. FEELING TIRED OR HAVING LITTLE ENERGY: SEVERAL DAYS
1. LITTLE INTEREST OR PLEASURE IN DOING THINGS: NOT AT ALL
2. FEELING DOWN, DEPRESSED OR HOPELESS: NOT AT ALL
6. FEELING BAD ABOUT YOURSELF - OR THAT YOU ARE A FAILURE OR HAVE LET YOURSELF OR YOUR FAMILY DOWN: NOT AT ALL
SUM OF ALL RESPONSES TO PHQ QUESTIONS 1-9: 1
SUM OF ALL RESPONSES TO PHQ QUESTIONS 1-9: 1
7. TROUBLE CONCENTRATING ON THINGS, SUCH AS READING THE NEWSPAPER OR WATCHING TELEVISION: NOT AT ALL
SUM OF ALL RESPONSES TO PHQ QUESTIONS 1-9: 1
3. TROUBLE FALLING OR STAYING ASLEEP: NOT AT ALL
5. POOR APPETITE OR OVEREATING: NOT AT ALL
SUM OF ALL RESPONSES TO PHQ QUESTIONS 1-9: 1
9. THOUGHTS THAT YOU WOULD BE BETTER OFF DEAD, OR OF HURTING YOURSELF: NOT AT ALL
8. MOVING OR SPEAKING SO SLOWLY THAT OTHER PEOPLE COULD HAVE NOTICED. OR THE OPPOSITE, BEING SO FIGETY OR RESTLESS THAT YOU HAVE BEEN MOVING AROUND A LOT MORE THAN USUAL: NOT AT ALL
10. IF YOU CHECKED OFF ANY PROBLEMS, HOW DIFFICULT HAVE THESE PROBLEMS MADE IT FOR YOU TO DO YOUR WORK, TAKE CARE OF THINGS AT HOME, OR GET ALONG WITH OTHER PEOPLE: NOT DIFFICULT AT ALL
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
## 2025-05-20 ENCOUNTER — RESULTS FOLLOW-UP (OUTPATIENT)
Dept: FAMILY MEDICINE CLINIC | Age: 50
End: 2025-05-20

## 2025-05-20 DIAGNOSIS — F98.8 ATTENTION DEFICIT DISORDER, UNSPECIFIED TYPE: ICD-10-CM

## 2025-05-20 NOTE — TELEPHONE ENCOUNTER
Refill Request - Controlled Substance    CONFIRM preferred pharmacy with the patient.    If Mail Order Rx - Pend for 90 day refill.        Last Seen Department: 5/16/2025  Last Seen by PCP: Visit date not found    Last Written: 3/10/2025    Last UDS: 4/29/2024    Med Agreement Signed On: 4/29/2024    If no future appointment scheduled:  Review the last OV with PCP and review information for follow-up visit,  Route STAFF MESSAGE with patient name to the  Pool for scheduling with the following information:            -  Timing of next visit           -  Visit type ie Physical, OV, etc           -  Diagnoses/Reason ie. COPD, HTN - Do not use MEDICATION, Follow-up or CHECK UP - Give reason for visit        Next Appointment:   Future Appointments   Date Time Provider Department Center   7/11/2025  3:30 PM Ellyn Roberts MD [facility]       Message sent to  to schedule appt with patient?  NO      Requested Prescriptions     Pending Prescriptions Disp Refills    amphetamine-dextroamphetamine (ADDERALL) 30 MG tablet 30 tablet 0     Sig: Take 1 tablet by mouth daily for 30 days.

## 2025-05-22 DIAGNOSIS — F98.8 ATTENTION DEFICIT DISORDER, UNSPECIFIED TYPE: ICD-10-CM

## 2025-05-22 RX ORDER — DEXTROAMPHETAMINE SACCHARATE, AMPHETAMINE ASPARTATE, DEXTROAMPHETAMINE SULFATE AND AMPHETAMINE SULFATE 7.5; 7.5; 7.5; 7.5 MG/1; MG/1; MG/1; MG/1
30 TABLET ORAL DAILY
Qty: 30 TABLET | Refills: 0 | Status: SHIPPED | OUTPATIENT
Start: 2025-05-22 | End: 2025-05-22 | Stop reason: SDUPTHER

## 2025-05-22 NOTE — TELEPHONE ENCOUNTER
Will fill for short course; has upcoming appt with me, needs updated UDS and drug contract. Thanks!

## 2025-05-23 RX ORDER — DEXTROAMPHETAMINE SACCHARATE, AMPHETAMINE ASPARTATE, DEXTROAMPHETAMINE SULFATE AND AMPHETAMINE SULFATE 7.5; 7.5; 7.5; 7.5 MG/1; MG/1; MG/1; MG/1
30 TABLET ORAL DAILY
Qty: 30 TABLET | Refills: 0 | Status: SHIPPED | OUTPATIENT
Start: 2025-05-23 | End: 2025-06-22

## 2025-05-23 NOTE — TELEPHONE ENCOUNTER
Refill Request - Controlled Substance    CONFIRM preferred pharmacy with the patient.    If Mail Order Rx - Pend for 90 day refill.        Last Seen Department: 5/16/2025  Last Seen by PCP: Visit date not found    Last Written: 5/22/25 30 with no refills     Last UDS: 4/29/24     Med Agreement Signed On: 4/29/24     If no future appointment scheduled:  Review the last OV with PCP and review information for follow-up visit,  Route STAFF MESSAGE with patient name to the  Pool for scheduling with the following information:            -  Timing of next visit           -  Visit type ie Physical, OV, etc           -  Diagnoses/Reason ie. COPD, HTN - Do not use MEDICATION, Follow-up or CHECK UP - Give reason for visit        Next Appointment:   Future Appointments   Date Time Provider Department Center   7/11/2025  3:30 PM Ellyn Roberts MD [facility]       Message sent to  to schedule appt with patient?  NO      Requested Prescriptions     Pending Prescriptions Disp Refills    amphetamine-dextroamphetamine (ADDERALL) 30 MG tablet 30 tablet 0     Sig: Take 1 tablet by mouth daily for 30 days.

## 2025-06-25 DIAGNOSIS — F98.8 ATTENTION DEFICIT DISORDER, UNSPECIFIED TYPE: ICD-10-CM

## 2025-06-26 RX ORDER — DEXTROAMPHETAMINE SACCHARATE, AMPHETAMINE ASPARTATE, DEXTROAMPHETAMINE SULFATE AND AMPHETAMINE SULFATE 7.5; 7.5; 7.5; 7.5 MG/1; MG/1; MG/1; MG/1
30 TABLET ORAL DAILY
Qty: 30 TABLET | Refills: 0 | Status: SHIPPED | OUTPATIENT
Start: 2025-06-26 | End: 2025-07-26

## 2025-06-26 NOTE — TELEPHONE ENCOUNTER
Refill Request - Controlled Substance    CONFIRM preferred pharmacy with the patient.    If Mail Order Rx - Pend for 90 day refill.        Last Seen Department: 5/16/2025  Last Seen by PCP: Visit date not found    Last Written: 5/23/25 30 with no refills     Patient Comment: I forgot that im leaving for vacation tomorrow evening and need a refill. Will you call this in first thing in the morning?     Last UDS: 4/29/24    Med Agreement Signed On: 4/29/24    If no future appointment scheduled:  Review the last OV with PCP and review information for follow-up visit,  Route STAFF MESSAGE with patient name to the  Pool for scheduling with the following information:            -  Timing of next visit           -  Visit type ie Physical, OV, etc           -  Diagnoses/Reason ie. COPD, HTN - Do not use MEDICATION, Follow-up or CHECK UP - Give reason for visit        Next Appointment:   Future Appointments   Date Time Provider Department Center   7/11/2025  3:30 PM Ellyn Roberts MD [facility]       Message sent to  to schedule appt with patient?  NO      Requested Prescriptions     Pending Prescriptions Disp Refills    amphetamine-dextroamphetamine (ADDERALL) 30 MG tablet 30 tablet 0     Sig: Take 1 tablet by mouth daily for 30 days.

## 2025-07-11 ENCOUNTER — OFFICE VISIT (OUTPATIENT)
Dept: FAMILY MEDICINE CLINIC | Age: 50
End: 2025-07-11
Payer: COMMERCIAL

## 2025-07-11 VITALS
SYSTOLIC BLOOD PRESSURE: 110 MMHG | HEART RATE: 100 BPM | BODY MASS INDEX: 25.46 KG/M2 | DIASTOLIC BLOOD PRESSURE: 62 MMHG | WEIGHT: 158.4 LBS | HEIGHT: 66 IN | OXYGEN SATURATION: 98 %

## 2025-07-11 DIAGNOSIS — Z12.31 SCREENING MAMMOGRAM FOR BREAST CANCER: ICD-10-CM

## 2025-07-11 DIAGNOSIS — F98.8 ATTENTION DEFICIT DISORDER, UNSPECIFIED TYPE: ICD-10-CM

## 2025-07-11 DIAGNOSIS — Z00.00 ANNUAL PHYSICAL EXAM: Primary | ICD-10-CM

## 2025-07-11 PROCEDURE — 99396 PREV VISIT EST AGE 40-64: CPT | Performed by: STUDENT IN AN ORGANIZED HEALTH CARE EDUCATION/TRAINING PROGRAM

## 2025-07-11 SDOH — HEALTH STABILITY: PHYSICAL HEALTH: ON AVERAGE, HOW MANY MINUTES DO YOU ENGAGE IN EXERCISE AT THIS LEVEL?: 20 MIN

## 2025-07-11 SDOH — HEALTH STABILITY: PHYSICAL HEALTH: ON AVERAGE, HOW MANY DAYS PER WEEK DO YOU ENGAGE IN MODERATE TO STRENUOUS EXERCISE (LIKE A BRISK WALK)?: 2 DAYS

## 2025-07-11 ASSESSMENT — PATIENT HEALTH QUESTIONNAIRE - PHQ9
4. FEELING TIRED OR HAVING LITTLE ENERGY: SEVERAL DAYS
SUM OF ALL RESPONSES TO PHQ QUESTIONS 1-9: 8
8. MOVING OR SPEAKING SO SLOWLY THAT OTHER PEOPLE COULD HAVE NOTICED. OR THE OPPOSITE, BEING SO FIGETY OR RESTLESS THAT YOU HAVE BEEN MOVING AROUND A LOT MORE THAN USUAL: NOT AT ALL
10. IF YOU CHECKED OFF ANY PROBLEMS, HOW DIFFICULT HAVE THESE PROBLEMS MADE IT FOR YOU TO DO YOUR WORK, TAKE CARE OF THINGS AT HOME, OR GET ALONG WITH OTHER PEOPLE: SOMEWHAT DIFFICULT
6. FEELING BAD ABOUT YOURSELF - OR THAT YOU ARE A FAILURE OR HAVE LET YOURSELF OR YOUR FAMILY DOWN: SEVERAL DAYS
2. FEELING DOWN, DEPRESSED OR HOPELESS: SEVERAL DAYS
5. POOR APPETITE OR OVEREATING: SEVERAL DAYS
DEPRESSION UNABLE TO ASSESS: FUNCTIONAL CAPACITY MOTIVATION LIMITS ACCURACY
SUM OF ALL RESPONSES TO PHQ QUESTIONS 1-9: 8
7. TROUBLE CONCENTRATING ON THINGS, SUCH AS READING THE NEWSPAPER OR WATCHING TELEVISION: SEVERAL DAYS
SUM OF ALL RESPONSES TO PHQ QUESTIONS 1-9: 8
1. LITTLE INTEREST OR PLEASURE IN DOING THINGS: SEVERAL DAYS
SUM OF ALL RESPONSES TO PHQ QUESTIONS 1-9: 8
3. TROUBLE FALLING OR STAYING ASLEEP: MORE THAN HALF THE DAYS
9. THOUGHTS THAT YOU WOULD BE BETTER OFF DEAD, OR OF HURTING YOURSELF: NOT AT ALL

## 2025-07-11 ASSESSMENT — ENCOUNTER SYMPTOMS
ABDOMINAL PAIN: 0
NAUSEA: 0
SHORTNESS OF BREATH: 0
COUGH: 0
VOMITING: 0

## 2025-07-11 NOTE — ASSESSMENT & PLAN NOTE
Chronic, at goal (stable), continue current treatment plan. UDS and drug contract today.     Orders:    Drug Panel-PM-HI Res-UR Interp-A

## 2025-07-11 NOTE — PROGRESS NOTES
Normal rate and regular rhythm.   Pulmonary:      Effort: Pulmonary effort is normal. No respiratory distress.      Breath sounds: Normal breath sounds.   Abdominal:      Palpations: Abdomen is soft.      Tenderness: There is no abdominal tenderness.   Musculoskeletal:         General: No swelling. Normal range of motion.   Skin:     General: Skin is warm and dry.   Neurological:      Mental Status: She is alert.   Psychiatric:         Mood and Affect: Mood normal.         Ellyn Roberts MD    This dictation was generated by voice recognition computer software.  Although all attempts are made to edit the dictation for accuracy, there may be errors in the transcription that are not intended.

## 2025-07-12 LAB — ANNOTATION COMMENT IMP: NORMAL

## 2025-07-16 LAB
6MAM UR QL: NOT DETECTED
7AMINOCLONAZEPAM UR QL: NOT DETECTED
A-OH ALPRAZ UR QL: NOT DETECTED
ALPHA-OH-MIDAZOLAM, URINE: NOT DETECTED
ALPRAZ UR QL: NOT DETECTED
AMPHET UR QL SCN: PRESENT
ANNOTATION COMMENT IMP: ABNORMAL
ANNOTATION COMMENT IMP: ABNORMAL
BARBITURATES UR QL: NEGATIVE
BUPRENORPHINE UR QL: NOT DETECTED
BZE UR QL: NEGATIVE
CARBOXYTHC UR QL: NEGATIVE
CARISOPRODOL UR QL: NEGATIVE
CLONAZEPAM UR QL: NOT DETECTED
CODEINE UR QL: NOT DETECTED
CREAT UR-MCNC: 111.5 MG/DL (ref 20–400)
DIAZEPAM UR QL: NOT DETECTED
ETHYL GLUCURONIDE UR QL: NEGATIVE
FENTANYL UR QL: NOT DETECTED
GABAPENTIN: NOT DETECTED
HYDROCODONE UR QL: NOT DETECTED
HYDROMORPHONE UR QL: NOT DETECTED
LORAZEPAM UR QL: NOT DETECTED
MDA UR QL: NOT DETECTED
MDEA UR QL: NOT DETECTED
MDMA UR QL: NOT DETECTED
MEPERIDINE UR QL: NOT DETECTED
METHADONE UR QL: NEGATIVE
METHAMPHET UR QL: NOT DETECTED
MIDAZOLAM UR QL SCN: NOT DETECTED
MORPHINE UR QL: NOT DETECTED
NALOXONE: NOT DETECTED
NORBUPRENORPHINE UR QL CFM: NOT DETECTED
NORDIAZEPAM UR QL: NOT DETECTED
NORFENTANYL UR QL: NOT DETECTED
NORHYDROCODONE UR QL CFM: NOT DETECTED
NOROXYCODONE UR QL CFM: NOT DETECTED
NOROXYMORPHONE, URINE: NOT DETECTED
OXAZEPAM UR QL: NOT DETECTED
OXYCODONE UR QL: NOT DETECTED
OXYMORPHONE UR QL: NOT DETECTED
PATHOLOGY STUDY: ABNORMAL
PCP UR QL: NEGATIVE
PHENTERMINE UR QL: NOT DETECTED
PPAA UR QL: NOT DETECTED
PREGABALIN: NOT DETECTED
SERVICE CMNT-IMP: ABNORMAL
TAPENTADOL UR QL SCN: NOT DETECTED
TAPENTADOL-O-SULFATE, URINE: NOT DETECTED
TEMAZEPAM UR QL: NOT DETECTED
TRAMADOL UR QL: NEGATIVE
ZOLPIDEM UR QL: NOT DETECTED

## 2025-07-17 ENCOUNTER — TELEPHONE (OUTPATIENT)
Dept: FAMILY MEDICINE CLINIC | Age: 50
End: 2025-07-17

## 2025-07-17 NOTE — TELEPHONE ENCOUNTER
Left message for patient to return phone call to the office regarding open Mammogram Order    SONI JOSEPH DIGITAL SCREEN BILATERAL [[id number]] (Order [id number])      Please help patient to schedule this

## 2025-08-08 DIAGNOSIS — F98.8 ATTENTION DEFICIT DISORDER, UNSPECIFIED TYPE: ICD-10-CM

## 2025-08-08 RX ORDER — DEXTROAMPHETAMINE SACCHARATE, AMPHETAMINE ASPARTATE, DEXTROAMPHETAMINE SULFATE AND AMPHETAMINE SULFATE 7.5; 7.5; 7.5; 7.5 MG/1; MG/1; MG/1; MG/1
30 TABLET ORAL DAILY
Qty: 30 TABLET | Refills: 0 | Status: SHIPPED | OUTPATIENT
Start: 2025-08-08 | End: 2025-09-07